# Patient Record
Sex: MALE | Race: WHITE | Employment: OTHER | URBAN - METROPOLITAN AREA
[De-identification: names, ages, dates, MRNs, and addresses within clinical notes are randomized per-mention and may not be internally consistent; named-entity substitution may affect disease eponyms.]

---

## 2017-10-16 ENCOUNTER — OFFICE VISIT (OUTPATIENT)
Dept: ORTHOPEDIC SURGERY | Age: 77
End: 2017-10-16

## 2017-10-16 VITALS — BODY MASS INDEX: 28.44 KG/M2 | HEIGHT: 72 IN | WEIGHT: 210 LBS

## 2017-10-16 DIAGNOSIS — R52 PAIN: ICD-10-CM

## 2017-10-16 DIAGNOSIS — M17.12 PATELLOFEMORAL ARTHRITIS OF LEFT KNEE: Primary | ICD-10-CM

## 2017-10-16 PROCEDURE — G8419 CALC BMI OUT NRM PARAM NOF/U: HCPCS | Performed by: ORTHOPAEDIC SURGERY

## 2017-10-16 PROCEDURE — 1123F ACP DISCUSS/DSCN MKR DOCD: CPT | Performed by: ORTHOPAEDIC SURGERY

## 2017-10-16 PROCEDURE — G8484 FLU IMMUNIZE NO ADMIN: HCPCS | Performed by: ORTHOPAEDIC SURGERY

## 2017-10-16 PROCEDURE — 99213 OFFICE O/P EST LOW 20 MIN: CPT | Performed by: ORTHOPAEDIC SURGERY

## 2017-10-16 PROCEDURE — 73564 X-RAY EXAM KNEE 4 OR MORE: CPT | Performed by: ORTHOPAEDIC SURGERY

## 2017-10-16 PROCEDURE — G8428 CUR MEDS NOT DOCUMENT: HCPCS | Performed by: ORTHOPAEDIC SURGERY

## 2017-10-16 PROCEDURE — 1036F TOBACCO NON-USER: CPT | Performed by: ORTHOPAEDIC SURGERY

## 2017-10-16 PROCEDURE — 4040F PNEUMOC VAC/ADMIN/RCVD: CPT | Performed by: ORTHOPAEDIC SURGERY

## 2017-10-16 NOTE — PROGRESS NOTES
There is no weakness or sensory deficit. Left Knee Examination:    Inspection:  No swelling, ecchymosis, deformity    Palpation:  There is palpation over the lateral facet. No significant medial or lateral joint line pain. Range of Motion:  ° of knee flexion. He does have about a 10-15° extension lag. Mild retropatellar crepitation. Tight hamstrings noted. Strength:  4/5 quad strength. 4+/5 hamstring strength. Special Tests:  Negative Nils's exam.  Negative Lachman's exam.  Stable to varus and valgus stress testing. Positive patellar grind. Skin: There are no rashes, ulcerations or lesions. Gait: Antalgic flexed knee gait pattern on the left    Reflex normal deep tendon reflexes    Additional Comments:       Additional Examinations:         Contralateral Exam: Examination of the right knee reveals intact skin. There is no focal tenderness. The patient demonstrates full painless range of motion with regard to flexion and extension. Strength is 5/5 throughout all planes. Ligamentous stability is grossly intact. Examination of the left hip reveals intact skin. The patient demonstrates full painless range of motion with regards to flexion, abduction, internal and external rotation. There is no tenderness about the greater trochanter. There is a negative straight leg raise against resistance. Strength is 5/5 throughout all planes. Radiology:     X-rays obtained and reviewed in office:  Views 4 views including AP weightbearing, PA flexed weightbearing, lateral and skyline  Location left knee  Impression there is relatively well-maintained joint space of the tibiofemoral joints. There is evidence of advanced arthritic changes of the patellofemoral joint with   previous surgery      Impression:  Encounter Diagnoses   Name Primary?     Pain     Patellofemoral arthritis of left knee Yes       Office Procedures:  Orders Placed This Encounter   Procedures    XR KNEE LEFT (MIN 4 VIEWS) [id number]     Order Specific Question:   Reason for exam:     Answer:   Pain       Treatment Plan: Today we've gone over the diagnosis and the recommendations. I think he was just here interested in figuring out what was the cause of his symptoms. He is relatively asymptomatic and states the pain is not that bad. He understands and eventually could have more pain with his patellofemoral joint. Would recommend over-the-counter oral anti-inflammatories or Tylenol therapeutic exercises for strengthening.   Follow-up in the office as needed

## 2017-12-01 PROBLEM — D75.89 MACROCYTOSIS: Status: ACTIVE | Noted: 2017-12-01

## 2017-12-01 PROBLEM — G93.40 ENCEPHALOPATHY ACUTE: Status: ACTIVE | Noted: 2017-12-01

## 2017-12-01 PROBLEM — R56.9 SEIZURE (HCC): Status: ACTIVE | Noted: 2017-12-01

## 2017-12-01 PROBLEM — J96.90 RESPIRATORY FAILURE (HCC): Status: ACTIVE | Noted: 2017-12-01

## 2017-12-01 PROBLEM — I10 HTN (HYPERTENSION): Status: ACTIVE | Noted: 2017-12-01

## 2017-12-01 PROBLEM — E78.5 HLD (HYPERLIPIDEMIA): Status: ACTIVE | Noted: 2017-12-01

## 2017-12-01 PROBLEM — J18.9 PNA (PNEUMONIA): Status: ACTIVE | Noted: 2017-12-01

## 2017-12-15 ENCOUNTER — TELEPHONE (OUTPATIENT)
Dept: NEUROLOGY | Age: 77
End: 2017-12-15

## 2017-12-15 NOTE — TELEPHONE ENCOUNTER
Patient calling, okay to schedule follow up? Please advise. UC notes are available in Care Everywhere.     Patient completed Inpatient Rehab at [facility] on 12.11.17

## 2018-01-11 ENCOUNTER — OFFICE VISIT (OUTPATIENT)
Dept: NEUROLOGY | Age: 78
End: 2018-01-11

## 2018-01-11 VITALS
HEART RATE: 81 BPM | OXYGEN SATURATION: 91 % | WEIGHT: 207 LBS | HEIGHT: 72 IN | DIASTOLIC BLOOD PRESSURE: 84 MMHG | BODY MASS INDEX: 28.04 KG/M2 | SYSTOLIC BLOOD PRESSURE: 116 MMHG

## 2018-01-11 DIAGNOSIS — F10.10 ETOH ABUSE: ICD-10-CM

## 2018-01-11 DIAGNOSIS — F02.80 DEMENTIA DUE TO ALZHEIMER'S DISEASE (HCC): ICD-10-CM

## 2018-01-11 DIAGNOSIS — I10 ESSENTIAL HYPERTENSION: ICD-10-CM

## 2018-01-11 DIAGNOSIS — G30.9 DEMENTIA DUE TO ALZHEIMER'S DISEASE (HCC): ICD-10-CM

## 2018-01-11 DIAGNOSIS — G40.909 SEIZURE DISORDER (HCC): ICD-10-CM

## 2018-01-11 DIAGNOSIS — R56.9 NEW ONSET SEIZURE (HCC): Primary | ICD-10-CM

## 2018-01-11 PROCEDURE — G8484 FLU IMMUNIZE NO ADMIN: HCPCS | Performed by: PSYCHIATRY & NEUROLOGY

## 2018-01-11 PROCEDURE — G8419 CALC BMI OUT NRM PARAM NOF/U: HCPCS | Performed by: PSYCHIATRY & NEUROLOGY

## 2018-01-11 PROCEDURE — 4040F PNEUMOC VAC/ADMIN/RCVD: CPT | Performed by: PSYCHIATRY & NEUROLOGY

## 2018-01-11 PROCEDURE — 1036F TOBACCO NON-USER: CPT | Performed by: PSYCHIATRY & NEUROLOGY

## 2018-01-11 PROCEDURE — 99214 OFFICE O/P EST MOD 30 MIN: CPT | Performed by: PSYCHIATRY & NEUROLOGY

## 2018-01-11 PROCEDURE — 1123F ACP DISCUSS/DSCN MKR DOCD: CPT | Performed by: PSYCHIATRY & NEUROLOGY

## 2018-01-11 PROCEDURE — G8427 DOCREV CUR MEDS BY ELIG CLIN: HCPCS | Performed by: PSYCHIATRY & NEUROLOGY

## 2018-01-11 RX ORDER — LEVETIRACETAM 1000 MG/1
1000 TABLET ORAL 2 TIMES DAILY
Qty: 180 TABLET | Refills: 1 | Status: SHIPPED | OUTPATIENT
Start: 2018-01-11 | End: 2018-07-18

## 2018-01-11 ASSESSMENT — ENCOUNTER SYMPTOMS
RESPIRATORY NEGATIVE: 1
GASTROINTESTINAL NEGATIVE: 1
EYES NEGATIVE: 1

## 2018-01-11 NOTE — PROGRESS NOTES
mg by mouth daily Yes Historical Provider, MD   galantamine (RAZADYNE ER) 24 MG extended release capsule Take 24 mg by mouth daily (with breakfast) Yes Historical Provider, MD     No Known Allergies  Social History   Substance Use Topics    Smoking status: Former Smoker     Quit date: 11/13/2003    Smokeless tobacco: Former User      Comment: cigar smoker    Alcohol use 1.2 - 1.8 oz/week     2 - 3 Cans of beer per week      Comment: 3 week      Family History   Problem Relation Age of Onset    Heart Disease Mother     Heart Disease Father      Past Surgical History:   Procedure Laterality Date    ANKLE SURGERY      RT    APPENDECTOMY      CHOLECYSTECTOMY      EYE SURGERY      bilateral cataracts    HERNIA REPAIR      rihr  x 2    KNEE ARTHROSCOPY Right 11/19/2013    KNEE SURGERY      QUADRACEPS TENDON REPAIR Left 7/17/15    SHOULDER ARTHROSCOPY  9/20/11    video arthroscopy shoulder debridement acromioplasty         Exam:   Constitutional: +  Vitals:    01/11/18 1316   BP: 116/84   Pulse: 81   SpO2: 91%   Weight: 207 lb (93.9 kg)   Height: 6' (1.829 m)       General appearance: well-nourished. Eye: No icterus. Neck: supple  Cardiovascular:    Heart: S1, S2         No lower leg edema with good pulsation. Mental Status: Oriented to person, place, problem, and time. Fluent speech. Normal attention span and concentration. Cranial Nerves:   II: Visual fields: Full to confrontation  III: Pupils: equal, round, reactive to light  III,IV,VI: Extra Ocular Movements are intact. No nystagmus  V: Facial sensation is intact to pin prick and light touch  VII: Facial strength and movements: intact and symmetric smile,cheek puffing and eyebrow elevation  VIII: Hearing: Intact to finger rub bilaterally  IX: Palate elevation is symmetric  XI: Shoulder shrug is intact  XII: Tongue movements are normal  Musculoskeletal: 5/5 in all 4 extremities. Normal tone.    Reflexes: Bilateral biceps 2/4, triceps 2/4,

## 2018-01-11 NOTE — LETTER
Tram Burrell MD    [facility]  [address] [facility], [address] [place] [address]. [address] [place] [address]    [phone] (Phone)  [phone] (Fax)    Dear Dr Asia Maxwell MD    I had the pleasure of seeing your patient Tito Slaughter  1940 today. I have attached a detailed summary below:    The patient came today for follow up regarding: New onset seizure and hospital follow up    The patient was seen last month at [facility]. He came in with recurrent seizures and status epilepticus. He was transferred to [facility] for continuous EEG recording. Further workup revealed no specific etiology for his new onset seizure. He was discharged home on Keppra 1000 mg twice daily. He came today for his follow-up. He denies any more seizures. He denies any side effect of Keppra. CT of the head showed no acute lesions and continuous EEG showed diffuse slowing. He has a pacemaker and cannot have an MRI of the brain. He denies any recent falling or injury or passing out. He used to drink socially but he stopped drinking since his discharge from the hospital.    He has history of chronic cognitive impairment and moderate dementia. He is on Razadyne daily. History of hypertension on medications. He is on Lipitor. Denies any recent head trauma or fever or chills. No worsening of his memory after his seizure. He does not drive. Other review of system was unremarkable. Past Medical History:   Diagnosis Date    Ankle fracture, right     Arthritis     Diastolic murmur     Gout     Heart valve problem     -long term ,no problem with    Hyperlipidemia     Hypertension     Pacemaker      Prior to Visit Medications    Medication Sig Taking?  Authorizing Provider   atorvastatin (LIPITOR) 80 MG tablet Take 1 tablet by mouth nightly Yes Naty Godwin MD   levETIRAcetam (KEPPRA) 1000 MG tablet Take 1 tablet by mouth 2 times daily Yes Rachel Mills MD   allopurinol (ZYLOPRIM) 300 MG tablet Take 300 mg by mouth daily Yes Historical Provider, MD   indomethacin (INDOCIN) 50 MG capsule Take 50 mg by mouth 2 times daily (with meals) Yes Historical Provider, MD   lisinopril (PRINIVIL;ZESTRIL) 10 MG tablet Take 10 mg by mouth daily Yes Historical Provider, MD   amLODIPine (NORVASC) 5 MG tablet Take 5 mg by mouth daily Yes Historical Provider, MD   galantamine (RAZADYNE ER) 24 MG extended release capsule Take 24 mg by mouth daily (with breakfast) Yes Historical Provider, MD     No Known Allergies  Social History   Substance Use Topics    Smoking status: Former Smoker     Quit date: 11/13/2003    Smokeless tobacco: Former User      Comment: cigar smoker    Alcohol use 1.2 - 1.8 oz/week     2 - 3 Cans of beer per week      Comment: 3 week      Family History   Problem Relation Age of Onset    Heart Disease Mother     Heart Disease Father      Past Surgical History:   Procedure Laterality Date    ANKLE SURGERY      RT    APPENDECTOMY      CHOLECYSTECTOMY      EYE SURGERY      bilateral cataracts    HERNIA REPAIR      rihr  x 2    KNEE ARTHROSCOPY Right 11/19/2013    KNEE SURGERY      QUADRACEPS TENDON REPAIR Left 7/17/15    SHOULDER ARTHROSCOPY  9/20/11    video arthroscopy shoulder debridement acromioplasty         Exam:   Constitutional: +  Vitals:    01/11/18 1316   BP: 116/84   Pulse: 81   SpO2: 91%   Weight: 207 lb (93.9 kg)   Height: 6' (1.829 m)       General appearance: well-nourished. Eye: No icterus. Neck: supple  Cardiovascular:    Heart: S1, S2         No lower leg edema with good pulsation. Mental Status: Oriented to person, place, problem, and time. Fluent speech. Normal attention span and concentration. Cranial Nerves:   II: Visual fields: Full to confrontation  III: Pupils: equal, round, reactive to light  III,IV,VI: Extra Ocular Movements are intact.  No nystagmus V: Facial sensation is intact to pin prick and light touch  VII: Facial strength and movements: intact and symmetric smile,cheek puffing and eyebrow elevation  VIII: Hearing: Intact to finger rub bilaterally  IX: Palate elevation is symmetric  XI: Shoulder shrug is intact  XII: Tongue movements are normal  Musculoskeletal: 5/5 in all 4 extremities. Normal tone. Reflexes: Bilateral biceps 2/4, triceps 2/4, brachial radialis 2/4, knee 2/4 and ankle 2/4. Planters: flexor bilaterally. Coordination: no pronator drift, no dysmetria. Finger nose finger testing within normal limits. Sensation: normal to all modalities. Gait/Posture: steady    Review of Systems   Constitutional: Negative. HENT: Negative. Eyes: Negative. Respiratory: Negative. Cardiovascular: Negative. Gastrointestinal: Negative. Genitourinary: Negative. Musculoskeletal: Negative. Skin: Negative. Neurological: Negative. Endo/Heme/Allergies: Negative. Psychiatric/Behavioral: Negative. I personally reviewed social history, past medical history, medications, allergy, surgical history, and family history as documented in the patient's electronic health records. Labs and test results: reviewed and discussed with the patient. Reviewed outside records including recent  hospital stay and records from his continuous EEG recording, test results, LP results and notes from different physicians. Assessment:  New onset seizure disorder and status epilepticus, improved. So far idiopathic or could be new onset epilepsy. Hypertension  Chronic cognitive impairment and dementia could be of Alzheimer type  Hyperlipidemia  Alcohol abuse      Plan:  I had a long discussion with the patient and his wife regarding seizure precautions, risk of falling and injury, driving restrictions and side effect from [address]. We also addressed the issue of drinking and advised the patient to stop drinking due to risk of seizure. The patient will continue with Keppra 1000 mg twice daily  I gave the patient six months refill of Keppra  No driving for at least three months provided seizure-free  Continue Razadyne for his dementia  Continue the same blood pressure medications and add aspirin daily  Improving sleep hygiene    RTC 6 months    Please do not hesitate to contact me, should you have any questions or concerns regarding the care of Teri Milligan     Sincerely,    Gabriela Stein MD    This dictation was generated by voice recognition computer software. Although all attempts are made to edit the dictation for accuracy, there may be errors in the transcription that are not intended.

## 2018-07-05 PROBLEM — R56.9 SEIZURES (HCC): Status: ACTIVE | Noted: 2018-07-05

## 2018-07-18 ENCOUNTER — OFFICE VISIT (OUTPATIENT)
Dept: NEUROLOGY | Age: 78
End: 2018-07-18

## 2018-07-18 VITALS
OXYGEN SATURATION: 95 % | BODY MASS INDEX: 27.9 KG/M2 | HEART RATE: 69 BPM | DIASTOLIC BLOOD PRESSURE: 76 MMHG | WEIGHT: 206 LBS | SYSTOLIC BLOOD PRESSURE: 122 MMHG | HEIGHT: 72 IN

## 2018-07-18 DIAGNOSIS — E78.5 DYSLIPIDEMIA: ICD-10-CM

## 2018-07-18 DIAGNOSIS — I10 HTN (HYPERTENSION), BENIGN: ICD-10-CM

## 2018-07-18 DIAGNOSIS — F02.80 DEMENTIA DUE TO ALZHEIMER'S DISEASE (HCC): ICD-10-CM

## 2018-07-18 DIAGNOSIS — F10.10 ETOH ABUSE: ICD-10-CM

## 2018-07-18 DIAGNOSIS — G40.109 EPILEPSY, FOCAL (HCC): Primary | ICD-10-CM

## 2018-07-18 DIAGNOSIS — G30.9 DEMENTIA DUE TO ALZHEIMER'S DISEASE (HCC): ICD-10-CM

## 2018-07-18 PROCEDURE — G8419 CALC BMI OUT NRM PARAM NOF/U: HCPCS | Performed by: PSYCHIATRY & NEUROLOGY

## 2018-07-18 PROCEDURE — 99214 OFFICE O/P EST MOD 30 MIN: CPT | Performed by: PSYCHIATRY & NEUROLOGY

## 2018-07-18 PROCEDURE — 4040F PNEUMOC VAC/ADMIN/RCVD: CPT | Performed by: PSYCHIATRY & NEUROLOGY

## 2018-07-18 PROCEDURE — 1036F TOBACCO NON-USER: CPT | Performed by: PSYCHIATRY & NEUROLOGY

## 2018-07-18 PROCEDURE — 1123F ACP DISCUSS/DSCN MKR DOCD: CPT | Performed by: PSYCHIATRY & NEUROLOGY

## 2018-07-18 PROCEDURE — G8427 DOCREV CUR MEDS BY ELIG CLIN: HCPCS | Performed by: PSYCHIATRY & NEUROLOGY

## 2018-07-18 PROCEDURE — 1101F PT FALLS ASSESS-DOCD LE1/YR: CPT | Performed by: PSYCHIATRY & NEUROLOGY

## 2018-07-18 RX ORDER — LEVETIRACETAM 750 MG/1
1500 TABLET ORAL 2 TIMES DAILY
COMMUNITY
Start: 2018-07-09 | End: 2018-09-14

## 2018-07-18 NOTE — PROGRESS NOTES
The patient came today for follow up regarding: Focal epilepsy and recent recurrent seizures. Since the patient's last visit, he was seen at [facility] ED about 2 weeks ago when he was observed to have recurrent focal seizures. Description was confusion with head version toward the left side for a few seconds to minute. He had clusters of seizure at home. He was seen at [facility] ED and then transferred to [facility] where he was admitted for a few days. Degree was severe. No triggers or other associated symptoms. He was consistent with Keppra at home. No recent head trauma or fever or chills. Last MRI of the brain from December was unremarkable. I was able to review such records and below is a summary of his evaluation. The patient was discharged home on higher dose of Keppra 1500 mg twice daily. He came today for his follow-up. He denies any side effect from [address]. He does not drive. He denies any suicidal ideation or thoughts are severe depression. He has been consistent with Keppra. He drinks socially. He is on aspirin and statin for stroke prevention. He is on few blood pressure medications. Blood pressure has been waxing and waning. He has a same chronic cognitive impairment and dementia and he takes Razadyne daily. He denies a severe sleep disturbance, insomnia or symptoms of sleep apnea. No recent fever or chills.   Other review of system was unremarkable.    ----------------------------------------------------------------------------------------    Copies of UC from his last admission:    cEEG start date & time: 07/07/2018 0500     The recording is remarkable for the presence of:   · Epileptiform discharges over the rt frontotemporal head regions maximal over the F8 EEG lead  · Multiple push buttons events with patient's head turning to left for a few seconds were seen without an EEG correlate.     During the recording:   Video was reviewed intermittently at times when significant amounts of EMG artifact were present.     The EKG monitoring channel did not detect any significant rhythm abnormalities.     EEG Interpretation: This EEG is abnormal secondary to:     · Epileptiform activity over the rt frontotemporal head region. This finding is consistent with an area of focal cortical irritability and a process of epileptogenic potential.      · Multiple episodes of brief, forced head turn to the left, that were stereotyped, but not associated with any obvoious EEG ictal discharge. Based on the stereotyped nature and the brief duration, these episodes are favored to be focal seizures without scalp manifestation. psychogenic nonepileptic seizures are thought to be much less likely. No electrographic seizures or non-convulsive status epilepticus was seen over the entire monitoring period. Clinical correlation is recommended. Past Medical History:   Diagnosis Date    Ankle fracture, right     Arthritis     Diastolic murmur     Gout     Heart valve problem     2011-long term ,no problem with    Hyperlipidemia     Hypertension     Pacemaker      Prior to Visit Medications    Medication Sig Taking?  Authorizing Provider   levETIRAcetam (KEPPRA) 750 MG tablet Take 1,500 mg by mouth 2 times daily Yes Historical Provider, MD   aspirin 81 MG tablet Take 81 mg by mouth daily Yes Historical Provider, MD   atorvastatin (LIPITOR) 80 MG tablet Take 1 tablet by mouth nightly Yes Cristhian Campbell MD   allopurinol (ZYLOPRIM) 300 MG tablet Take 300 mg by mouth daily Yes Historical Provider, MD   indomethacin (INDOCIN) 50 MG capsule Take 50 mg by mouth 2 times daily (with meals) Yes Historical Provider, MD   lisinopril (PRINIVIL;ZESTRIL) 10 MG tablet Take 10 mg by mouth daily Yes Historical Provider, MD   amLODIPine (NORVASC) 5 MG tablet Take 5 mg by mouth daily Yes Historical Provider, MD   galantamine (RAZADYNE ER) 24 MG extended release capsule Take 24 mg by mouth daily (with breakfast) Yes Historical Provider, MD     No Known Allergies  Social History   Substance Use Topics    Smoking status: Former Smoker     Quit date: 11/13/2003    Smokeless tobacco: Former User      Comment: cigar smoker    Alcohol use 1.2 - 1.8 oz/week     2 - 3 Cans of beer per week      Comment: 3 week      Family History   Problem Relation Age of Onset    Heart Disease Mother     Heart Disease Father      Past Surgical History:   Procedure Laterality Date    ANKLE SURGERY      RT    APPENDECTOMY      CHOLECYSTECTOMY      EYE SURGERY      bilateral cataracts    HERNIA REPAIR      rihr  x 2    KNEE ARTHROSCOPY Right 11/19/2013    KNEE SURGERY      QUADRACEPS TENDON REPAIR Left 7/17/15    SHOULDER ARTHROSCOPY  9/20/11    video arthroscopy shoulder debridement acromioplasty         Exam:   Constitutional:   Vitals:    07/18/18 1054   BP: 122/76   Pulse: 69   SpO2: 95%   Weight: 206 lb (93.4 kg)   Height: 6' (1.829 m)       General appearance: well-nourished. Eye: No icterus. No blurring of optic disc. Neck: supple  Cardiovascular: No carotid bruit. Heart: S1, S2         No lower leg edema with good pulsation. Mental Status: Oriented to person, place, problem, and time. Fluent speech. Poor fund of knowledge. Normal attention span and concentration. Cranial Nerves:   II: Visual fields: Full to confrontation  III: Pupils: equal, round, reactive to light  III,IV,VI: Extra Ocular Movements are intact. No nystagmus  V: Facial sensation is intact to pin prick and light touch  VII: Facial strength and movements: intact and symmetric smile,cheek puffing and eyebrow elevation  VIII: Hearing: Intact to finger rub bilaterally  IX: Palate elevation is symmetric  XI: Shoulder shrug is intact  XII: Tongue movements are normal  Musculoskeletal: 5/5 in all 4 extremities. Normal tone. Reflexes: Bilateral biceps 2/4, triceps 2/4, brachial radialis 2/4, knee 2/4 and ankle 2/4.    Planters: flexor

## 2018-09-13 DIAGNOSIS — G40.909 SEIZURE DISORDER (HCC): ICD-10-CM

## 2018-09-13 RX ORDER — LEVETIRACETAM 1000 MG/1
1500 TABLET ORAL 2 TIMES DAILY
Qty: 180 TABLET | Refills: 1 | Status: SHIPPED | OUTPATIENT
Start: 2018-09-13 | End: 2018-09-13 | Stop reason: SDUPTHER

## 2018-09-14 RX ORDER — LEVETIRACETAM 1000 MG/1
TABLET ORAL
Qty: 270 TABLET | Refills: 0 | Status: SHIPPED | OUTPATIENT
Start: 2018-09-14 | End: 2019-05-21

## 2018-09-14 NOTE — TELEPHONE ENCOUNTER
Ts signed for 2 month supply with 1 refill on 09.13.18    Will send rx in for 3 month supply with no refill.

## 2018-10-24 ENCOUNTER — TELEPHONE (OUTPATIENT)
Dept: NEUROLOGY | Age: 78
End: 2018-10-24

## 2018-10-24 ENCOUNTER — OFFICE VISIT (OUTPATIENT)
Dept: NEUROLOGY | Age: 78
End: 2018-10-24
Payer: MEDICARE

## 2018-10-24 VITALS
DIASTOLIC BLOOD PRESSURE: 75 MMHG | OXYGEN SATURATION: 95 % | HEART RATE: 60 BPM | HEIGHT: 72 IN | SYSTOLIC BLOOD PRESSURE: 112 MMHG | WEIGHT: 206 LBS | BODY MASS INDEX: 27.9 KG/M2

## 2018-10-24 DIAGNOSIS — G40.109 EPILEPSY, FOCAL (HCC): Primary | ICD-10-CM

## 2018-10-24 DIAGNOSIS — E78.5 DYSLIPIDEMIA: ICD-10-CM

## 2018-10-24 DIAGNOSIS — I10 HTN (HYPERTENSION), BENIGN: ICD-10-CM

## 2018-10-24 DIAGNOSIS — F10.10 ETOH ABUSE: ICD-10-CM

## 2018-10-24 DIAGNOSIS — G30.9 DEMENTIA DUE TO ALZHEIMER'S DISEASE (HCC): ICD-10-CM

## 2018-10-24 DIAGNOSIS — F02.80 DEMENTIA DUE TO ALZHEIMER'S DISEASE (HCC): ICD-10-CM

## 2018-10-24 PROCEDURE — G8427 DOCREV CUR MEDS BY ELIG CLIN: HCPCS | Performed by: PSYCHIATRY & NEUROLOGY

## 2018-10-24 PROCEDURE — 1036F TOBACCO NON-USER: CPT | Performed by: PSYCHIATRY & NEUROLOGY

## 2018-10-24 PROCEDURE — 99214 OFFICE O/P EST MOD 30 MIN: CPT | Performed by: PSYCHIATRY & NEUROLOGY

## 2018-10-24 PROCEDURE — G8419 CALC BMI OUT NRM PARAM NOF/U: HCPCS | Performed by: PSYCHIATRY & NEUROLOGY

## 2018-10-24 PROCEDURE — 1101F PT FALLS ASSESS-DOCD LE1/YR: CPT | Performed by: PSYCHIATRY & NEUROLOGY

## 2018-10-24 PROCEDURE — 1123F ACP DISCUSS/DSCN MKR DOCD: CPT | Performed by: PSYCHIATRY & NEUROLOGY

## 2018-10-24 PROCEDURE — G8484 FLU IMMUNIZE NO ADMIN: HCPCS | Performed by: PSYCHIATRY & NEUROLOGY

## 2018-10-24 PROCEDURE — 4040F PNEUMOC VAC/ADMIN/RCVD: CPT | Performed by: PSYCHIATRY & NEUROLOGY

## 2018-10-24 NOTE — PROGRESS NOTES
status: Former Smoker     Quit date: 11/13/2003    Smokeless tobacco: Former User      Comment: cigar smoker    Alcohol use 1.2 - 1.8 oz/week     2 - 3 Cans of beer per week      Comment: 3 week      Family History   Problem Relation Age of Onset    Heart Disease Mother     Heart Disease Father      Past Surgical History:   Procedure Laterality Date    ANKLE SURGERY      RT    APPENDECTOMY      CHOLECYSTECTOMY      EYE SURGERY      bilateral cataracts    HERNIA REPAIR      rihr  x 2    KNEE ARTHROSCOPY Right 11/19/2013    KNEE SURGERY      QUADRACEPS TENDON REPAIR Left 7/17/15    SHOULDER ARTHROSCOPY  9/20/11    video arthroscopy shoulder debridement acromioplasty         Exam:   Constitutional:   Vitals:    10/24/18 1207   BP: 112/75   Pulse: 60   SpO2: 95%   Weight: 206 lb (93.4 kg)   Height: 6' (1.829 m)       General appearance: well-nourished. Eye: No icterus. Neck: supple  Cardiovascular: No carotid bruit. No lower leg edema with good pulsation. Mental Status: Oriented to person, place, problem, and time. Fluent speech. Poor fund of knowledge. Normal attention span and concentration. Cranial Nerves:   II: Visual fields: Full to confrontation  III: Pupils: equal, round, reactive to light  III,IV,VI: Extra Ocular Movements are intact. No nystagmus  V: Facial sensation is intact to pin prick and light touch  VII: Facial strength and movements: intact and symmetric smile,cheek puffing and eyebrow elevation  VIII: Hearing: Intact to finger rub bilaterally  IX: Palate elevation is symmetric  XI: Shoulder shrug is intact  XII: Tongue movements are normal  Musculoskeletal: 5/5 in all 4 extremities. Normal tone. Reflexes: Bilateral biceps 2/4, triceps 2/4, brachial radialis 2/4, knee 2/4 and ankle 2/4. Planters: flexor bilaterally. Coordination: no pronator drift, no dysmetria. Finger nose finger testing within normal limits.   Sensation: normal to all

## 2018-10-26 ENCOUNTER — HOSPITAL ENCOUNTER (OUTPATIENT)
Age: 78
Discharge: HOME | End: 2018-10-26
Payer: MEDICARE

## 2018-10-26 ENCOUNTER — HOSPITAL ENCOUNTER (OUTPATIENT)
Dept: MRI IMAGING | Age: 78
Discharge: HOME | End: 2018-10-26
Payer: MEDICARE

## 2018-10-26 DIAGNOSIS — G40.109 EPILEPSY, FOCAL (HCC): ICD-10-CM

## 2018-10-26 LAB
BUN BLDV-MCNC: 15 MG/DL (ref 7–20)
CREAT SERPL-MCNC: 0.9 MG/DL (ref 0.8–1.3)
GFR AFRICAN AMERICAN: >60
GFR NON-AFRICAN AMERICAN: >60

## 2018-10-26 PROCEDURE — 84520 ASSAY OF UREA NITROGEN: CPT

## 2018-10-26 PROCEDURE — A9579 GAD-BASE MR CONTRAST NOS,1ML: HCPCS | Performed by: PSYCHIATRY & NEUROLOGY

## 2018-10-26 PROCEDURE — 70553 MRI BRAIN STEM W/O & W/DYE: CPT

## 2018-10-26 PROCEDURE — 82565 ASSAY OF CREATININE: CPT

## 2018-10-26 PROCEDURE — 6360000004 HC RX CONTRAST MEDICATION: Performed by: PSYCHIATRY & NEUROLOGY

## 2018-10-26 PROCEDURE — 36415 COLL VENOUS BLD VENIPUNCTURE: CPT

## 2018-10-26 RX ADMIN — GADOTERIDOL 18 ML: 279.3 INJECTION, SOLUTION INTRAVENOUS at 15:46

## 2018-10-26 NOTE — FLOWSHEET NOTE
Patient arrived for scan with MRI compatible pacemaker. Cardiology approval and patient screening completed per protocol. Representative from pacemaker company set pacemaker to safe mode. Patient's ECG and SPO2 were monitored throughout the scan with no difficulty. Representative from pacemaker company reset pacemaker to previous settings. .  Scan was completed with no complications.

## 2019-01-09 DIAGNOSIS — G40.909 SEIZURE DISORDER (HCC): ICD-10-CM

## 2019-01-09 RX ORDER — LEVETIRACETAM 1000 MG/1
1500 TABLET ORAL 2 TIMES DAILY
Qty: 270 TABLET | Refills: 1 | Status: SHIPPED | OUTPATIENT
Start: 2019-01-09 | End: 2019-05-21 | Stop reason: SDUPTHER

## 2019-05-21 ENCOUNTER — OFFICE VISIT (OUTPATIENT)
Dept: NEUROLOGY | Age: 79
End: 2019-05-21
Payer: MEDICARE

## 2019-05-21 VITALS
BODY MASS INDEX: 28.17 KG/M2 | WEIGHT: 208 LBS | OXYGEN SATURATION: 94 % | DIASTOLIC BLOOD PRESSURE: 98 MMHG | HEART RATE: 75 BPM | HEIGHT: 72 IN | SYSTOLIC BLOOD PRESSURE: 139 MMHG

## 2019-05-21 DIAGNOSIS — G30.9 DEMENTIA DUE TO ALZHEIMER'S DISEASE (HCC): ICD-10-CM

## 2019-05-21 DIAGNOSIS — G40.909 SEIZURE DISORDER (HCC): Primary | ICD-10-CM

## 2019-05-21 DIAGNOSIS — F10.10 ETOH ABUSE: ICD-10-CM

## 2019-05-21 DIAGNOSIS — E78.5 DYSLIPIDEMIA: ICD-10-CM

## 2019-05-21 DIAGNOSIS — F02.80 DEMENTIA DUE TO ALZHEIMER'S DISEASE (HCC): ICD-10-CM

## 2019-05-21 DIAGNOSIS — G40.109 EPILEPSY, FOCAL (HCC): ICD-10-CM

## 2019-05-21 DIAGNOSIS — I10 HTN (HYPERTENSION), BENIGN: ICD-10-CM

## 2019-05-21 PROCEDURE — 4040F PNEUMOC VAC/ADMIN/RCVD: CPT | Performed by: PSYCHIATRY & NEUROLOGY

## 2019-05-21 PROCEDURE — 99214 OFFICE O/P EST MOD 30 MIN: CPT | Performed by: PSYCHIATRY & NEUROLOGY

## 2019-05-21 PROCEDURE — G8419 CALC BMI OUT NRM PARAM NOF/U: HCPCS | Performed by: PSYCHIATRY & NEUROLOGY

## 2019-05-21 PROCEDURE — G8427 DOCREV CUR MEDS BY ELIG CLIN: HCPCS | Performed by: PSYCHIATRY & NEUROLOGY

## 2019-05-21 PROCEDURE — 1036F TOBACCO NON-USER: CPT | Performed by: PSYCHIATRY & NEUROLOGY

## 2019-05-21 PROCEDURE — 1123F ACP DISCUSS/DSCN MKR DOCD: CPT | Performed by: PSYCHIATRY & NEUROLOGY

## 2019-05-21 RX ORDER — LEVETIRACETAM 1000 MG/1
1500 TABLET ORAL 2 TIMES DAILY
Qty: 270 TABLET | Refills: 1 | Status: SHIPPED | OUTPATIENT
Start: 2019-05-21 | End: 2019-11-12 | Stop reason: SDUPTHER

## 2019-05-21 NOTE — PROGRESS NOTES
The patient came today for follow up regarding: Epilepsy, history of stroke and memory loss. Since the patient's last visit, he denies any seizure. Last seizure could be 2 years ago. Description was brief motor seizure for few seconds to minutes. Degree was severe. No triggers or other associated symptoms. He is on Keppra 1500 mg twice daily. No side effect from Keppra. No suicidal ideation or thoughts. He is trying to quit drinking. He now drinks socially. Denies any recent falling or injury or blackout. No head trauma or fever or chills. History of hypertension controlled on medication. He is on aspirin. He takes statin at night. He quit smoking. History of chronic cognitive impairment which is waxing and waning and daily. Degree is mild to moderate. No psychosis or hallucination. He denies any severe insomnia or other sleep disturbance. He is on Razadyne daily. He  denies any chest pain, dysphagia or dysarthria, neck or back pain or weakness or numbness or tingling. Other review of system was unremarkable. Past Medical History:   Diagnosis Date    Ankle fracture, right     Arthritis     Diastolic murmur     Gout     Heart valve problem     2011-long term ,no problem with    Hyperlipidemia     Hypertension     Pacemaker      Prior to Visit Medications    Medication Sig Taking?  Authorizing Provider   levETIRAcetam (KEPPRA) 1000 MG tablet Take 1.5 tablets by mouth 2 times daily Yes Kobe Faith MD   aspirin 81 MG tablet Take 81 mg by mouth daily Yes Historical Provider, MD   atorvastatin (LIPITOR) 80 MG tablet Take 1 tablet by mouth nightly Yes Alba Pickens MD   allopurinol (ZYLOPRIM) 300 MG tablet Take 300 mg by mouth daily Yes Historical Provider, MD   indomethacin (INDOCIN) 50 MG capsule Take 50 mg by mouth 2 times daily (with meals) Yes Historical Provider, MD   lisinopril (PRINIVIL;ZESTRIL) 10 MG tablet Take 10 mg by mouth daily Yes Historical Provider, MD amLODIPine (NORVASC) 5 MG tablet Take 5 mg by mouth daily Yes Historical Provider, MD   galantamine (RAZADYNE ER) 24 MG extended release capsule Take 24 mg by mouth daily (with breakfast) Yes Historical Provider, MD     No Known Allergies  Social History     Tobacco Use    Smoking status: Former Smoker     Last attempt to quit: 11/13/2003     Years since quitting: 15.5    Smokeless tobacco: Former User    Tobacco comment: cigar smoker   Substance Use Topics    Alcohol use: Yes     Alcohol/week: 1.2 - 1.8 oz     Types: 2 - 3 Cans of beer per week     Comment: 3 week      Family History   Problem Relation Age of Onset    Heart Disease Mother     Heart Disease Father      Past Surgical History:   Procedure Laterality Date    ANKLE SURGERY      RT    APPENDECTOMY      CHOLECYSTECTOMY      EYE SURGERY      bilateral cataracts    HERNIA REPAIR      rihr  x 2    KNEE ARTHROSCOPY Right 11/19/2013    KNEE SURGERY      QUADRACEPS TENDON REPAIR Left 7/17/15    SHOULDER ARTHROSCOPY  9/20/11    video arthroscopy shoulder debridement acromioplasty         Exam:   Constitutional:   Vitals:    05/21/19 1344 05/21/19 1358   BP: (!) 132/96 (!) 139/98   Pulse: 75    SpO2: 94%    Weight: 208 lb (94.3 kg)    Height: 6' (1.829 m)        General appearance: well-nourished. Eye: No icterus. Neck: supple  Cardiovascular: No carotid bruit. No lower leg edema with good pulsation. Mental Status: Oriented to person, place, problem, and time. Fluent speech. Poor fund of knowledge. Normal attention span and concentration. Intact recent and remote memory. Cranial Nerves:   II: Pupils: equal, round, reactive to light  III,IV,VI: Extra Ocular Movements are intact.  No nystagmus  V: Facial sensation is intact to pin prick and light touch  VII: Facial strength and movements: intact and symmetric smile,cheek puffing and eyebrow elevation  VIII: Hearing: Intact to finger rub bilaterally  IX: Palate elevation is symmetric  XI: Shoulder shrug is intact  XII: Tongue movements are normal  Musculoskeletal: 5/5 in all 4 extremities. Normal tone. Reflexes: Bilateral biceps 2/4, triceps 2/4, brachial radialis 2/4, knee 2/4 and ankle 2/4. Planters: flexor bilaterally. Coordination: no pronator drift, no dysmetria. Finger nose finger testing within normal limits. Sensation: normal to all modalities. Gait/Posture: steady    ROS : A 10-12 system review of constitutional, cardiovascular, respiratory, musculoskeletal, endocrine, hematological, skin, SHEENT, genitourinary, psychiatric and neurologic systems was obtained and updated today which is unremarkable except as mentioned in my HPI      Medical decision making:  I personally reviewed and updated social history, past medical history, medications, allergy, surgical history, and family history as documented in the patient's electronic health records. Labs and/or neuroimaging and other test results reviewed and discussed with the patient. Reviewed notes from other physicians. Provided patient education regarding risk, benefits and treatment options as well as adherence to medication regimen and side effect from these medications. Diagnosis Orders   1. Seizure disorder (HCC)  levETIRAcetam (KEPPRA) 1000 MG tablet   2. Epilepsy, focal ([address]     3. HTN (hypertension), benign     4. ETOH abuse     5. Dyslipidemia     6.  Dementia due to Alzheimer's disease           Plan:    Continue Keppra 1500 mg twice daily  Refill for medication  Complete side effect profile was discussed with the patient in details  Seizure precautions, risk of falling or injury and driving precautions were addressed  Secondary stroke prevention with aspirin and statin  Continue blood pressure monitor at home  Continue current blood pressure medications  Avoid drinking  Fall precautions  Continue Razadyne for chronic cognitive impairment  Improving sleep hygiene, cognitive and behavior therapy to improve memory was discussed today  Follow-up in 6 month

## 2019-10-01 DIAGNOSIS — G40.909 SEIZURE DISORDER (HCC): ICD-10-CM

## 2019-10-01 RX ORDER — LEVETIRACETAM 1000 MG/1
TABLET ORAL
Qty: 270 TABLET | Refills: 0 | OUTPATIENT
Start: 2019-10-01

## 2019-11-12 ENCOUNTER — OFFICE VISIT (OUTPATIENT)
Dept: NEUROLOGY | Age: 79
End: 2019-11-12
Payer: MEDICARE

## 2019-11-12 VITALS
OXYGEN SATURATION: 96 % | SYSTOLIC BLOOD PRESSURE: 133 MMHG | WEIGHT: 208 LBS | HEART RATE: 79 BPM | BODY MASS INDEX: 28.17 KG/M2 | HEIGHT: 72 IN | DIASTOLIC BLOOD PRESSURE: 77 MMHG

## 2019-11-12 DIAGNOSIS — F02.80 DEMENTIA DUE TO ALZHEIMER'S DISEASE (HCC): ICD-10-CM

## 2019-11-12 DIAGNOSIS — G30.9 DEMENTIA DUE TO ALZHEIMER'S DISEASE (HCC): ICD-10-CM

## 2019-11-12 DIAGNOSIS — E78.5 DYSLIPIDEMIA: ICD-10-CM

## 2019-11-12 DIAGNOSIS — I10 HTN (HYPERTENSION), BENIGN: ICD-10-CM

## 2019-11-12 DIAGNOSIS — G40.909 SEIZURE DISORDER (HCC): Primary | ICD-10-CM

## 2019-11-12 DIAGNOSIS — G40.109 EPILEPSY, FOCAL (HCC): ICD-10-CM

## 2019-11-12 PROCEDURE — G8417 CALC BMI ABV UP PARAM F/U: HCPCS | Performed by: PSYCHIATRY & NEUROLOGY

## 2019-11-12 PROCEDURE — G8484 FLU IMMUNIZE NO ADMIN: HCPCS | Performed by: PSYCHIATRY & NEUROLOGY

## 2019-11-12 PROCEDURE — 4040F PNEUMOC VAC/ADMIN/RCVD: CPT | Performed by: PSYCHIATRY & NEUROLOGY

## 2019-11-12 PROCEDURE — 1123F ACP DISCUSS/DSCN MKR DOCD: CPT | Performed by: PSYCHIATRY & NEUROLOGY

## 2019-11-12 PROCEDURE — 99214 OFFICE O/P EST MOD 30 MIN: CPT | Performed by: PSYCHIATRY & NEUROLOGY

## 2019-11-12 PROCEDURE — 1036F TOBACCO NON-USER: CPT | Performed by: PSYCHIATRY & NEUROLOGY

## 2019-11-12 PROCEDURE — G8427 DOCREV CUR MEDS BY ELIG CLIN: HCPCS | Performed by: PSYCHIATRY & NEUROLOGY

## 2019-11-12 RX ORDER — LEVETIRACETAM 1000 MG/1
1500 TABLET ORAL 2 TIMES DAILY
Qty: 270 TABLET | Refills: 1 | Status: SHIPPED | OUTPATIENT
Start: 2019-11-12 | End: 2020-04-21 | Stop reason: SDUPTHER

## 2019-12-02 ENCOUNTER — OFFICE VISIT (OUTPATIENT)
Dept: ORTHOPEDIC SURGERY | Age: 79
End: 2019-12-02
Payer: MEDICARE

## 2019-12-02 VITALS — WEIGHT: 199 LBS | BODY MASS INDEX: 26.99 KG/M2

## 2019-12-02 DIAGNOSIS — S83.412A SPRAIN OF MEDIAL COLLATERAL LIGAMENT OF LEFT KNEE, INITIAL ENCOUNTER: ICD-10-CM

## 2019-12-02 DIAGNOSIS — M25.562 LEFT KNEE PAIN, UNSPECIFIED CHRONICITY: Primary | ICD-10-CM

## 2019-12-02 PROCEDURE — G8484 FLU IMMUNIZE NO ADMIN: HCPCS | Performed by: ORTHOPAEDIC SURGERY

## 2019-12-02 PROCEDURE — 1123F ACP DISCUSS/DSCN MKR DOCD: CPT | Performed by: ORTHOPAEDIC SURGERY

## 2019-12-02 PROCEDURE — 99213 OFFICE O/P EST LOW 20 MIN: CPT | Performed by: ORTHOPAEDIC SURGERY

## 2019-12-02 PROCEDURE — 1036F TOBACCO NON-USER: CPT | Performed by: ORTHOPAEDIC SURGERY

## 2019-12-02 PROCEDURE — G8417 CALC BMI ABV UP PARAM F/U: HCPCS | Performed by: ORTHOPAEDIC SURGERY

## 2019-12-02 PROCEDURE — 4040F PNEUMOC VAC/ADMIN/RCVD: CPT | Performed by: ORTHOPAEDIC SURGERY

## 2019-12-02 PROCEDURE — L1812 KO ELASTIC W/JOINTS PRE OTS: HCPCS | Performed by: ORTHOPAEDIC SURGERY

## 2019-12-02 PROCEDURE — G8427 DOCREV CUR MEDS BY ELIG CLIN: HCPCS | Performed by: ORTHOPAEDIC SURGERY

## 2020-03-25 PROBLEM — E78.5 HLD (HYPERLIPIDEMIA): Status: RESOLVED | Noted: 2017-12-01 | Resolved: 2020-03-24

## 2020-04-15 RX ORDER — LEVETIRACETAM 1000 MG/1
TABLET ORAL
Qty: 270 TABLET | Refills: 1 | OUTPATIENT
Start: 2020-04-15

## 2020-04-21 ENCOUNTER — VIRTUAL VISIT (OUTPATIENT)
Dept: NEUROLOGY | Age: 80
End: 2020-04-21
Payer: MEDICARE

## 2020-04-21 PROCEDURE — 99442 PR PHYS/QHP TELEPHONE EVALUATION 11-20 MIN: CPT | Performed by: PSYCHIATRY & NEUROLOGY

## 2020-04-21 RX ORDER — LEVETIRACETAM 1000 MG/1
1500 TABLET ORAL 2 TIMES DAILY
Qty: 270 TABLET | Refills: 0 | Status: SHIPPED | OUTPATIENT
Start: 2020-04-21 | End: 2020-06-18 | Stop reason: SDUPTHER

## 2020-06-18 ENCOUNTER — OFFICE VISIT (OUTPATIENT)
Dept: NEUROLOGY | Age: 80
End: 2020-06-18
Payer: MEDICARE

## 2020-06-18 VITALS
SYSTOLIC BLOOD PRESSURE: 115 MMHG | HEIGHT: 72 IN | HEART RATE: 78 BPM | DIASTOLIC BLOOD PRESSURE: 75 MMHG | OXYGEN SATURATION: 96 % | BODY MASS INDEX: 28.17 KG/M2 | WEIGHT: 208 LBS | TEMPERATURE: 97.9 F

## 2020-06-18 PROCEDURE — 4040F PNEUMOC VAC/ADMIN/RCVD: CPT | Performed by: PSYCHIATRY & NEUROLOGY

## 2020-06-18 PROCEDURE — G8417 CALC BMI ABV UP PARAM F/U: HCPCS | Performed by: PSYCHIATRY & NEUROLOGY

## 2020-06-18 PROCEDURE — 99214 OFFICE O/P EST MOD 30 MIN: CPT | Performed by: PSYCHIATRY & NEUROLOGY

## 2020-06-18 PROCEDURE — 1123F ACP DISCUSS/DSCN MKR DOCD: CPT | Performed by: PSYCHIATRY & NEUROLOGY

## 2020-06-18 PROCEDURE — 1036F TOBACCO NON-USER: CPT | Performed by: PSYCHIATRY & NEUROLOGY

## 2020-06-18 PROCEDURE — G8427 DOCREV CUR MEDS BY ELIG CLIN: HCPCS | Performed by: PSYCHIATRY & NEUROLOGY

## 2020-06-18 RX ORDER — LEVETIRACETAM 1000 MG/1
1500 TABLET ORAL 2 TIMES DAILY
Qty: 270 TABLET | Refills: 1 | Status: SHIPPED | OUTPATIENT
Start: 2020-06-18 | End: 2020-12-16 | Stop reason: SDUPTHER

## 2020-06-18 NOTE — PROGRESS NOTES
The patient came today for follow up regarding: Epilepsy, history of stroke and memory loss. Since his last visit, he denies any recent seizure. Last seizure could be in 2017. Description was brief motor seizure for few minutes and degree was severe. No aura or warning. No other associated symptoms. He denies any difficulty since he started taking Keppra. He is on 1500 mg twice daily. No side effect from Keppra. No recent falling, injury, fever, LOC or ARTI. No suicidal ideation or thoughts. He currently lives by himself since his wife passed away about 9 months ago. He continues to drink beer weekly but not daily. He denies recent falling or headache. No chest pain or visual changes. History of hypertension and hyperlipidemia. No recent change in his medications. He is on aspirin and statin. Continues to have intermittent short-term memory loss. Degree is moderate. He can be forgetful to daily events but able to manage without difficulties. He is on Razadyne 24 mg daily. No side effect of such medication. Occasional insomnia but no severe parasomnia or other sleep disorders. He denies any worsening depression, neck or back pain and other ROS was negative. Past Medical History:   Diagnosis Date    Ankle fracture, right     Arthritis     Diastolic murmur     Gout     Heart valve problem     2011-long term ,no problem with    Hyperlipidemia     Hypertension     Pacemaker      Prior to Visit Medications    Medication Sig Taking?  Authorizing Provider   levETIRAcetam (KEPPRA) 1000 MG tablet Take 1.5 tablets by mouth 2 times daily Yes Mohini Rose MD   atorvastatin (LIPITOR) 80 MG tablet Take 1 tablet by mouth nightly Yes Judy Mena MD   allopurinol (ZYLOPRIM) 300 MG tablet Take 300 mg by mouth daily Yes Historical Provider, MD   indomethacin (INDOCIN) 50 MG capsule Take 50 mg by mouth 2 times daily (with meals) Yes Historical Provider, MD   lisinopril

## 2020-11-03 PROBLEM — I10 HTN (HYPERTENSION): Status: RESOLVED | Noted: 2017-12-01 | Resolved: 2020-11-03

## 2020-12-16 ENCOUNTER — TELEPHONE (OUTPATIENT)
Dept: NEUROLOGY | Age: 80
End: 2020-12-16

## 2020-12-16 RX ORDER — GALANTAMINE HYDROBROMIDE 24 MG/1
24 CAPSULE, EXTENDED RELEASE ORAL
Qty: 90 CAPSULE | Refills: 0 | Status: SHIPPED | OUTPATIENT
Start: 2020-12-16 | End: 2021-06-22

## 2020-12-16 RX ORDER — LEVETIRACETAM 1000 MG/1
1500 TABLET ORAL 2 TIMES DAILY
Qty: 270 TABLET | Refills: 0 | Status: SHIPPED | OUTPATIENT
Start: 2020-12-16 | End: 2021-06-22 | Stop reason: SDUPTHER

## 2020-12-16 NOTE — TELEPHONE ENCOUNTER
Okay to refill Razadyne 24 mg and Keppra 1500 mg? Please advise. Patient called to cancel his appt for tomorrow, he is currently in [place] until March. Appointment was rs for 03.30.21, but he will be out of medication before then. Patient read medication names from bottle, but I do not see that you have written for the Razadyne. Last seen 06.18.20         Diagnosis Orders   1. Epilepsy, focal ([facility]  levETIRAcetam (KEPPRA) 1000 MG tablet   2. Seizure disorder (HCC)  levETIRAcetam (KEPPRA) 1000 MG tablet   3. Dementia due to Alzheimer's disease ([facility]      4. ETOH abuse      5. HTN (hypertension), benign      6.  Dyslipidemia          Plan:  Continue Keppra 1500 mg twice daily  6-month refill  Discussed seizure precaution, risk of falling and injury and driving restrictions  Continue Razadyne 24 mg daily for his dementia  Continue frequent walking and exercise  Aspirin  Statin  Counseled the patient again regarding risk of drinking including risk of falling and worsening dementia  Continue current blood pressure medication  Monitor blood pressure at home  Follow lipid panel  Continue current cognitive and behavioral therapy  Follow-up 6-month

## 2021-06-22 ENCOUNTER — OFFICE VISIT (OUTPATIENT)
Dept: NEUROLOGY | Age: 81
End: 2021-06-22
Payer: MEDICARE

## 2021-06-22 VITALS
BODY MASS INDEX: 28.17 KG/M2 | OXYGEN SATURATION: 95 % | SYSTOLIC BLOOD PRESSURE: 116 MMHG | WEIGHT: 208 LBS | HEART RATE: 83 BPM | HEIGHT: 72 IN | DIASTOLIC BLOOD PRESSURE: 78 MMHG

## 2021-06-22 DIAGNOSIS — F02.80 DEMENTIA DUE TO ALZHEIMER'S DISEASE (HCC): ICD-10-CM

## 2021-06-22 DIAGNOSIS — G30.9 DEMENTIA DUE TO ALZHEIMER'S DISEASE (HCC): ICD-10-CM

## 2021-06-22 DIAGNOSIS — I10 HTN (HYPERTENSION), BENIGN: ICD-10-CM

## 2021-06-22 DIAGNOSIS — G40.109 EPILEPSY, FOCAL (HCC): Primary | ICD-10-CM

## 2021-06-22 DIAGNOSIS — G40.909 SEIZURE DISORDER (HCC): ICD-10-CM

## 2021-06-22 PROCEDURE — 4040F PNEUMOC VAC/ADMIN/RCVD: CPT | Performed by: PSYCHIATRY & NEUROLOGY

## 2021-06-22 PROCEDURE — G8427 DOCREV CUR MEDS BY ELIG CLIN: HCPCS | Performed by: PSYCHIATRY & NEUROLOGY

## 2021-06-22 PROCEDURE — G8417 CALC BMI ABV UP PARAM F/U: HCPCS | Performed by: PSYCHIATRY & NEUROLOGY

## 2021-06-22 PROCEDURE — 99213 OFFICE O/P EST LOW 20 MIN: CPT | Performed by: PSYCHIATRY & NEUROLOGY

## 2021-06-22 PROCEDURE — 1123F ACP DISCUSS/DSCN MKR DOCD: CPT | Performed by: PSYCHIATRY & NEUROLOGY

## 2021-06-22 PROCEDURE — 1036F TOBACCO NON-USER: CPT | Performed by: PSYCHIATRY & NEUROLOGY

## 2021-06-22 RX ORDER — LEVETIRACETAM 1000 MG/1
1500 TABLET ORAL 2 TIMES DAILY
Qty: 270 TABLET | Refills: 1 | Status: SHIPPED | OUTPATIENT
Start: 2021-06-22 | End: 2021-12-07

## 2021-06-22 NOTE — PROGRESS NOTES
The patient came today for follow up regarding: Epilepsy, history of stroke and memory loss. Since his last visit, he denies any recent seizure. Last seizure could be in 2017. Description was brief motor seizure. He is on Keppra 1500 mg x 2. No side effect from Keppra. No suicidal ideation or thoughts or behavior issues. He had his blood test recently with PCP oh less than 6-month ago. According to his granddaughter, blood tests were normal.    He denies any worsening of his memory. He stopped taking Razadyne due to insurance issue. He denies any falling, injury, hallucination or sleep issues. He continues to be independent in his daily living. He only drinks socially. Not daily. He quit smoking. He takes the same blood pressure medicine. Other review of system was unremarkable. .    Past Medical History:   Diagnosis Date    Ankle fracture, right     Arthritis     Diastolic murmur     Gout     Heart valve problem     -long term ,no problem with    Hyperlipidemia     Hypertension     Pacemaker      Prior to Visit Medications    Medication Sig Taking?  Authorizing Provider   levETIRAcetam (KEPPRA) 1000 MG tablet Take 1.5 tablets by mouth 2 times daily Yes Matthieu Ivy MD   atorvastatin (LIPITOR) 80 MG tablet Take 1 tablet by mouth nightly Yes Claudia Thompson MD   allopurinol (ZYLOPRIM) 300 MG tablet Take 300 mg by mouth daily Yes Historical Provider, MD   indomethacin (INDOCIN) 50 MG capsule Take 50 mg by mouth 2 times daily (with meals) Yes Historical Provider, MD   lisinopril (PRINIVIL;ZESTRIL) 10 MG tablet Take 10 mg by mouth daily Yes Historical Provider, MD   amLODIPine (NORVASC) 5 MG tablet Take 5 mg by mouth daily Yes Historical Provider, MD     No Known Allergies  Social History     Tobacco Use    Smoking status: Former Smoker     Quit date: 2003     Years since quittin.6    Smokeless tobacco: Former User    Tobacco comment: cigar smoker   Substance Use Topics    Alcohol use: Yes     Alcohol/week: 2.0 - 3.0 standard drinks     Types: 2 - 3 Cans of beer per week     Comment: 3 week      Family History   Problem Relation Age of Onset    Heart Disease Mother     Heart Disease Father      Past Surgical History:   Procedure Laterality Date    ANKLE SURGERY      RT    APPENDECTOMY      CHOLECYSTECTOMY      EYE SURGERY      bilateral cataracts    HERNIA REPAIR      rihr  x 2    KNEE ARTHROSCOPY Right 11/19/2013    KNEE SURGERY      QUADRACEPS TENDON REPAIR Left 7/17/15    SHOULDER ARTHROSCOPY  9/20/11    video arthroscopy shoulder debridement acromioplasty         Exam:   Constitutional:   Vitals:    06/22/21 1250   BP: 116/78   Pulse: 83   SpO2: 95%   Weight: 208 lb (94.3 kg)   Height: 6' (1.829 m)       General appearance: well-nourished. Mental Status:   Oriented to person, place, problem, and time. Fluent speech. Poor fund of knowledge. Normal attention span and concentration. Intact recent and remote memory. Cranial Nerves:   II: Pupils: equal, round, reactive to light  III,IV,VI: Extra Ocular Movements are intact. No nystagmus  V: Facial sensation is intact  VII: Facial strength and movements: intact and symmetric  XII: Tongue movements are normal  Musculoskeletal: 5/5 in all 4 extremities. Normal tone. Coordination: no pronator drift, no dysmetria. Sensation: normal to all modalities. Gait/Posture: steady        ROS : A 10-12 system review of constitutional, cardiovascular, respiratory, musculoskeletal, endocrine, hematological, skin, SHEENT, genitourinary, psychiatric and neurologic systems was obtained and updated today which is unremarkable except as mentioned in my HPI  The same      Medical decision making:  I personally reviewed and updated social history, past medical history, medications, allergy, surgical history, and family history as documented in the patient's electronic health records.      Reviewed notes from different physician  Obtain history from his family        Diagnosis Orders   1. Epilepsy, focal ([facility]  levETIRAcetam (KEPPRA) 1000 MG tablet   2. Seizure disorder (HCC)  levETIRAcetam (KEPPRA) 1000 MG tablet   3.  Dementia due to Alzheimer's disease ([facility]           Plan:  Continue Keppra 1500 mg twice daily  6-month refill  Discussed side effect, risk of benefits of decreasing Keppra as well as seizure precautions  Continue current cognitive therapy for his memory impairment  Discussed risk of alcohol abuse  Continue current blood pressure medications  Fall precautions  No need to start new medication for memory unless symptoms worsen  Improving sleep hygiene  Follow-up 6-month

## 2021-12-07 DIAGNOSIS — G40.909 SEIZURE DISORDER (HCC): ICD-10-CM

## 2021-12-07 DIAGNOSIS — G40.109 EPILEPSY, FOCAL (HCC): ICD-10-CM

## 2021-12-07 RX ORDER — LEVETIRACETAM 1000 MG/1
TABLET ORAL
Qty: 270 TABLET | Refills: 1 | Status: SHIPPED | OUTPATIENT
Start: 2021-12-07 | End: 2022-08-04 | Stop reason: SDUPTHER

## 2021-12-07 NOTE — TELEPHONE ENCOUNTER
Refill request for keppra medication.      Name of Jaylin      Last visit - 6/22/2021     Pending visit - 5/3/2022    Last refill -6/22/2021  1 refill

## 2022-08-04 ENCOUNTER — OFFICE VISIT (OUTPATIENT)
Dept: NEUROLOGY | Age: 82
End: 2022-08-04
Payer: MEDICARE

## 2022-08-04 VITALS
HEART RATE: 65 BPM | DIASTOLIC BLOOD PRESSURE: 55 MMHG | BODY MASS INDEX: 27.77 KG/M2 | WEIGHT: 205 LBS | OXYGEN SATURATION: 96 % | SYSTOLIC BLOOD PRESSURE: 113 MMHG | HEIGHT: 72 IN

## 2022-08-04 DIAGNOSIS — G30.9 DEMENTIA DUE TO ALZHEIMER'S DISEASE (HCC): ICD-10-CM

## 2022-08-04 DIAGNOSIS — G40.109 EPILEPSY, FOCAL (HCC): Primary | ICD-10-CM

## 2022-08-04 DIAGNOSIS — F02.80 DEMENTIA DUE TO ALZHEIMER'S DISEASE (HCC): ICD-10-CM

## 2022-08-04 DIAGNOSIS — I10 HTN (HYPERTENSION), BENIGN: ICD-10-CM

## 2022-08-04 DIAGNOSIS — G40.909 SEIZURE DISORDER (HCC): ICD-10-CM

## 2022-08-04 PROCEDURE — 1123F ACP DISCUSS/DSCN MKR DOCD: CPT | Performed by: PSYCHIATRY & NEUROLOGY

## 2022-08-04 PROCEDURE — G8417 CALC BMI ABV UP PARAM F/U: HCPCS | Performed by: PSYCHIATRY & NEUROLOGY

## 2022-08-04 PROCEDURE — 1036F TOBACCO NON-USER: CPT | Performed by: PSYCHIATRY & NEUROLOGY

## 2022-08-04 PROCEDURE — G8427 DOCREV CUR MEDS BY ELIG CLIN: HCPCS | Performed by: PSYCHIATRY & NEUROLOGY

## 2022-08-04 PROCEDURE — 99214 OFFICE O/P EST MOD 30 MIN: CPT | Performed by: PSYCHIATRY & NEUROLOGY

## 2022-08-04 RX ORDER — LEVETIRACETAM 1000 MG/1
TABLET ORAL
Qty: 270 TABLET | Refills: 1 | Status: SHIPPED | OUTPATIENT
Start: 2022-08-04

## 2022-08-04 RX ORDER — ASPIRIN 81 MG/1
1 TABLET ORAL DAILY
COMMUNITY
Start: 2022-07-19

## 2022-08-04 NOTE — PROGRESS NOTES
The patient came today for follow up regarding: Epilepsy, history of stroke and memory loss. The patient came with his daughter today  No seizure since his last visit. Last seizure was in 2017. On the same dose of Keppra 300 mg/day. No side effect of such medication. He lives by himself but has good family support. No recent falling or injury. No major sleep insomnia or parasomnia. No behavior changes or side effect from Keppra    Occasional episodic short-term memory loss. Degree is moderate but not severe. No hallucination or severe depression. He is independent. Elana Morgan He is on the same blood pressure medicine. He takes statin. Recent blood test from last month reviewed. Other review of system was unremarkable. Past Medical History:   Diagnosis Date    Ankle fracture, right     Arthritis     Diastolic murmur     Gout     Heart valve problem     -long term ,no problem with    Hyperlipidemia     Hypertension     Pacemaker      Prior to Visit Medications    Medication Sig Taking? Authorizing Provider   levETIRAcetam (KEPPRA) 1000 MG tablet TAKE ONE AND ONE-HALF TABLET BY MOUTH TWICE DAILY Yes Zaida Schmitz MD   aspirin 81 MG EC tablet Take 1 tablet by mouth in the morning.  Yes Historical Provider, MD   atorvastatin (LIPITOR) 80 MG tablet Take 1 tablet by mouth nightly Yes Christina Naranjo MD   allopurinol (ZYLOPRIM) 300 MG tablet Take 300 mg by mouth daily Yes Historical Provider, MD   lisinopril (PRINIVIL;ZESTRIL) 10 MG tablet Take 10 mg by mouth daily Yes Historical Provider, MD   indomethacin (INDOCIN) 50 MG capsule Take 50 mg by mouth 2 times daily (with meals)  Patient not taking: Reported on 2022  Historical Provider, MD     No Known Allergies  Social History     Tobacco Use    Smoking status: Former     Types: Cigarettes     Quit date: 2003     Years since quittin.7    Smokeless tobacco: Former    Tobacco comments:     cigar smoker   Substance Use Topics Alcohol use: Yes     Alcohol/week: 2.0 - 3.0 standard drinks     Types: 2 - 3 Cans of beer per week     Comment: 3 week      Family History   Problem Relation Age of Onset    Heart Disease Mother     Heart Disease Father      Past Surgical History:   Procedure Laterality Date    ANKLE SURGERY      RT    APPENDECTOMY      CARDIAC VALVE REPLACEMENT  07/18/2022    CHOLECYSTECTOMY      EYE SURGERY      bilateral cataracts    HERNIA REPAIR      rihr  x 2    KNEE ARTHROSCOPY Right 11/19/2013    KNEE SURGERY      QUADRACEPS TENDON REPAIR Left 07/17/2015    SHOULDER ARTHROSCOPY  09/20/2011    video arthroscopy shoulder debridement acromioplasty         Exam:   Constitutional:   Vitals:    08/04/22 1219   BP: (!) 113/55   Site: Right Wrist   Position: Sitting   Pulse: 65   SpO2: 96%   Weight: 205 lb (93 kg)   Height: 6' (1.829 m)       General appearance: well-nourished. Mental Status:   Oriented to person, place, problem, and time. Fluent speech. Poor fund of knowledge. Normal attention span and concentration. Intact recent and remote memory. Cranial Nerves:   II: Pupils: equal, round, reactive to light  III,IV,VI: Extra Ocular Movements are intact. No nystagmus  V: Facial sensation is intact  VII: Facial strength and movements: intact and symmetric  XII: Tongue movements are normal  Musculoskeletal: 5/5 in all 4 extremities. Normal tone. Coordination: no pronator drift, no dysmetria. Sensation: normal to all modalities.   Gait/Posture: steady        ROS : A 10-12 system review of constitutional, cardiovascular, respiratory, musculoskeletal, endocrine, hematological, skin, SHEENT, genitourinary, psychiatric and neurologic systems was obtained and updated today which is unremarkable except as mentioned in my HPI  The same      Medical decision making:  I personally reviewed and updated social history, past medical history, medications, allergy, surgical history, and family history as documented in the patient's electronic health records. Reviewed notes from different physician  Obtained history from his family  Reviewed blood test with daughter from last month        Diagnosis Orders   1. Epilepsy, focal ([address]  levETIRAcetam (KEPPRA) 1000 MG tablet    Levetiracetam Level      2. Seizure disorder (HCC)  levETIRAcetam (KEPPRA) 1000 MG tablet      3. Dementia due to Alzheimer's disease ([address]        4. HTN (hypertension), benign              Plan:  Continue Keppra 1500 mg twice daily  Check Keppra level  6-month refill for Keppra  Discussed side effect of Keppra with the patient and his daughter. Will consider decreasing Keppra dose if he continues to be seizure-free.   Cognitive therapy addressed  Monitor blood pressure on current medications  Aspirin  Statin  Improving sleep hygiene  Driving precautions  Follow-up 6-month

## 2023-12-30 ENCOUNTER — APPOINTMENT (OUTPATIENT)
Dept: CT IMAGING | Age: 83
End: 2023-12-30
Payer: MEDICARE

## 2023-12-30 ENCOUNTER — APPOINTMENT (OUTPATIENT)
Dept: GENERAL RADIOLOGY | Age: 83
End: 2023-12-30
Payer: MEDICARE

## 2023-12-30 ENCOUNTER — HOSPITAL ENCOUNTER (INPATIENT)
Age: 83
LOS: 5 days | Discharge: NURSING, REHAB OR LONG-TERM CARE | DRG: 101 | End: 2024-01-04
Attending: PSYCHIATRY & NEUROLOGY | Admitting: PSYCHIATRY & NEUROLOGY
Payer: MEDICARE

## 2023-12-30 ENCOUNTER — HOSPITAL ENCOUNTER (EMERGENCY)
Age: 83
Discharge: ANOTHER HOSPITAL | End: 2023-12-30
Attending: STUDENT IN AN ORGANIZED HEALTH CARE EDUCATION/TRAINING PROGRAM
Payer: MEDICARE

## 2023-12-30 VITALS
SYSTOLIC BLOOD PRESSURE: 115 MMHG | OXYGEN SATURATION: 99 % | HEART RATE: 85 BPM | HEIGHT: 72 IN | WEIGHT: 190.5 LBS | RESPIRATION RATE: 14 BRPM | DIASTOLIC BLOOD PRESSURE: 77 MMHG | TEMPERATURE: 97.8 F | BODY MASS INDEX: 25.8 KG/M2

## 2023-12-30 DIAGNOSIS — G40.209 COMPLEX PARTIAL SEIZURE DISORDER (HCC): Primary | ICD-10-CM

## 2023-12-30 DIAGNOSIS — R41.82 ALTERED MENTAL STATUS, UNSPECIFIED ALTERED MENTAL STATUS TYPE: ICD-10-CM

## 2023-12-30 DIAGNOSIS — R56.9 SEIZURES (HCC): Primary | ICD-10-CM

## 2023-12-30 PROBLEM — G40.919 BREAKTHROUGH SEIZURE (HCC): Status: ACTIVE | Noted: 2017-12-01

## 2023-12-30 LAB
ALBUMIN SERPL-MCNC: 4.5 G/DL (ref 3.4–5)
ALBUMIN/GLOB SERPL: 1.1 {RATIO} (ref 1.1–2.2)
ALP SERPL-CCNC: 105 U/L (ref 40–129)
ALT SERPL-CCNC: 23 U/L (ref 10–40)
AMPHETAMINES UR QL SCN>1000 NG/ML: NORMAL
ANION GAP SERPL CALCULATED.3IONS-SCNC: 27 MMOL/L (ref 3–16)
AST SERPL-CCNC: 39 U/L (ref 15–37)
BACTERIA URNS QL MICRO: ABNORMAL /HPF
BARBITURATES UR QL SCN>200 NG/ML: NORMAL
BASOPHILS # BLD: 0 K/UL (ref 0–0.2)
BASOPHILS NFR BLD: 0.6 %
BENZODIAZ UR QL SCN>200 NG/ML: NORMAL
BILIRUB SERPL-MCNC: 0.5 MG/DL (ref 0–1)
BILIRUB UR QL STRIP.AUTO: NEGATIVE
BUN SERPL-MCNC: 16 MG/DL (ref 7–20)
CALCIUM SERPL-MCNC: 10.1 MG/DL (ref 8.3–10.6)
CANNABINOIDS UR QL SCN>50 NG/ML: NORMAL
CHLORIDE SERPL-SCNC: 101 MMOL/L (ref 99–110)
CHP ED QC CHECK: 98
CLARITY UR: CLEAR
CO2 SERPL-SCNC: 15 MMOL/L (ref 21–32)
COCAINE UR QL SCN: NORMAL
COLOR UR: YELLOW
CREAT SERPL-MCNC: 1 MG/DL (ref 0.8–1.3)
DEPRECATED RDW RBC AUTO: 14.1 % (ref 12.4–15.4)
DRUG SCREEN COMMENT UR-IMP: NORMAL
EKG ATRIAL RATE: 90 BPM
EKG DIAGNOSIS: NORMAL
EKG P AXIS: 40 DEGREES
EKG P-R INTERVAL: 208 MS
EKG Q-T INTERVAL: 460 MS
EKG QRS DURATION: 190 MS
EKG QTC CALCULATION (BAZETT): 562 MS
EKG R AXIS: -72 DEGREES
EKG T AXIS: 87 DEGREES
EKG VENTRICULAR RATE: 90 BPM
EOSINOPHIL # BLD: 0.3 K/UL (ref 0–0.6)
EOSINOPHIL NFR BLD: 4.1 %
EPI CELLS #/AREA URNS HPF: ABNORMAL /HPF (ref 0–5)
ETHANOLAMINE SERPL-MCNC: NORMAL MG/DL (ref 0–0.08)
FENTANYL SCREEN, URINE: NORMAL
GFR SERPLBLD CREATININE-BSD FMLA CKD-EPI: >60 ML/MIN/{1.73_M2}
GLUCOSE BLD-MCNC: 98 MG/DL (ref 70–99)
GLUCOSE SERPL-MCNC: 135 MG/DL (ref 70–99)
GLUCOSE UR STRIP.AUTO-MCNC: NEGATIVE MG/DL
HCT VFR BLD AUTO: 38.9 % (ref 40.5–52.5)
HGB BLD-MCNC: 12.6 G/DL (ref 13.5–17.5)
HGB UR QL STRIP.AUTO: NEGATIVE
INR PPP: 1.09 (ref 0.84–1.16)
KETONES UR STRIP.AUTO-MCNC: NEGATIVE MG/DL
LACTATE BLDV-SCNC: 11.6 MMOL/L (ref 0.4–2)
LEUKOCYTE ESTERASE UR QL STRIP.AUTO: NEGATIVE
LEVETIRACETAM SERPL-MCNC: 42 UG/ML (ref 6–46)
LYMPHOCYTES # BLD: 2.4 K/UL (ref 1–5.1)
LYMPHOCYTES NFR BLD: 38.2 %
MAGNESIUM SERPL-MCNC: 2 MG/DL (ref 1.8–2.4)
MCH RBC QN AUTO: 33.5 PG (ref 26–34)
MCHC RBC AUTO-ENTMCNC: 32.3 G/DL (ref 31–36)
MCV RBC AUTO: 103.5 FL (ref 80–100)
MEDICATION DOSE-MCNC: NORMAL
METHADONE UR QL SCN>300 NG/ML: NORMAL
MONOCYTES # BLD: 0.7 K/UL (ref 0–1.3)
MONOCYTES NFR BLD: 11.5 %
NEUTROPHILS # BLD: 2.9 K/UL (ref 1.7–7.7)
NEUTROPHILS NFR BLD: 45.6 %
NITRITE UR QL STRIP.AUTO: NEGATIVE
OPIATES UR QL SCN>300 NG/ML: NORMAL
OXYCODONE UR QL SCN: NORMAL
PCP UR QL SCN>25 NG/ML: NORMAL
PERFORMED ON: NORMAL
PH UR STRIP.AUTO: 6 [PH] (ref 5–8)
PH UR STRIP: 6 [PH]
PLATELET # BLD AUTO: 91 K/UL (ref 135–450)
PLATELET BLD QL SMEAR: ABNORMAL
PMV BLD AUTO: 8.6 FL (ref 5–10.5)
POTASSIUM SERPL-SCNC: 3.4 MMOL/L (ref 3.5–5.1)
PROT SERPL-MCNC: 8.6 G/DL (ref 6.4–8.2)
PROT UR STRIP.AUTO-MCNC: 30 MG/DL
PROTHROMBIN TIME: 14.1 SEC (ref 11.5–14.8)
RBC # BLD AUTO: 3.76 M/UL (ref 4.2–5.9)
RBC #/AREA URNS HPF: ABNORMAL /HPF (ref 0–4)
SLIDE REVIEW: ABNORMAL
SODIUM SERPL-SCNC: 143 MMOL/L (ref 136–145)
SP GR UR STRIP.AUTO: 1.01 (ref 1–1.03)
TROPONIN, HIGH SENSITIVITY: 62 NG/L (ref 0–22)
TROPONIN, HIGH SENSITIVITY: 68 NG/L (ref 0–22)
UA COMPLETE W REFLEX CULTURE PNL UR: ABNORMAL
UA DIPSTICK W REFLEX MICRO PNL UR: YES
URN SPEC COLLECT METH UR: ABNORMAL
UROBILINOGEN UR STRIP-ACNC: 0.2 E.U./DL
WBC # BLD AUTO: 6.4 K/UL (ref 4–11)
WBC #/AREA URNS HPF: ABNORMAL /HPF (ref 0–5)

## 2023-12-30 PROCEDURE — 85610 PROTHROMBIN TIME: CPT

## 2023-12-30 PROCEDURE — 80177 DRUG SCRN QUAN LEVETIRACETAM: CPT

## 2023-12-30 PROCEDURE — 84484 ASSAY OF TROPONIN QUANT: CPT

## 2023-12-30 PROCEDURE — 83735 ASSAY OF MAGNESIUM: CPT

## 2023-12-30 PROCEDURE — 2580000003 HC RX 258: Performed by: STUDENT IN AN ORGANIZED HEALTH CARE EDUCATION/TRAINING PROGRAM

## 2023-12-30 PROCEDURE — 80053 COMPREHEN METABOLIC PANEL: CPT

## 2023-12-30 PROCEDURE — 6360000002 HC RX W HCPCS

## 2023-12-30 PROCEDURE — 2060000000 HC ICU INTERMEDIATE R&B

## 2023-12-30 PROCEDURE — 93005 ELECTROCARDIOGRAM TRACING: CPT | Performed by: STUDENT IN AN ORGANIZED HEALTH CARE EDUCATION/TRAINING PROGRAM

## 2023-12-30 PROCEDURE — 70450 CT HEAD/BRAIN W/O DYE: CPT

## 2023-12-30 PROCEDURE — 70498 CT ANGIOGRAPHY NECK: CPT

## 2023-12-30 PROCEDURE — 71045 X-RAY EXAM CHEST 1 VIEW: CPT

## 2023-12-30 PROCEDURE — 85025 COMPLETE CBC W/AUTO DIFF WBC: CPT

## 2023-12-30 PROCEDURE — 6360000002 HC RX W HCPCS: Performed by: STUDENT IN AN ORGANIZED HEALTH CARE EDUCATION/TRAINING PROGRAM

## 2023-12-30 PROCEDURE — 99223 1ST HOSP IP/OBS HIGH 75: CPT | Performed by: PSYCHIATRY & NEUROLOGY

## 2023-12-30 PROCEDURE — 6360000002 HC RX W HCPCS: Performed by: PSYCHIATRY & NEUROLOGY

## 2023-12-30 PROCEDURE — 2500000003 HC RX 250 WO HCPCS: Performed by: STUDENT IN AN ORGANIZED HEALTH CARE EDUCATION/TRAINING PROGRAM

## 2023-12-30 PROCEDURE — 82077 ASSAY SPEC XCP UR&BREATH IA: CPT

## 2023-12-30 PROCEDURE — 99285 EMERGENCY DEPT VISIT HI MDM: CPT

## 2023-12-30 PROCEDURE — 93010 ELECTROCARDIOGRAM REPORT: CPT | Performed by: INTERNAL MEDICINE

## 2023-12-30 PROCEDURE — 80307 DRUG TEST PRSMV CHEM ANLYZR: CPT

## 2023-12-30 PROCEDURE — 6360000004 HC RX CONTRAST MEDICATION: Performed by: STUDENT IN AN ORGANIZED HEALTH CARE EDUCATION/TRAINING PROGRAM

## 2023-12-30 PROCEDURE — 6370000000 HC RX 637 (ALT 250 FOR IP): Performed by: STUDENT IN AN ORGANIZED HEALTH CARE EDUCATION/TRAINING PROGRAM

## 2023-12-30 PROCEDURE — 81001 URINALYSIS AUTO W/SCOPE: CPT

## 2023-12-30 PROCEDURE — 83605 ASSAY OF LACTIC ACID: CPT

## 2023-12-30 RX ORDER — LEVETIRACETAM 500 MG/5ML
1500 INJECTION, SOLUTION, CONCENTRATE INTRAVENOUS EVERY 12 HOURS
Status: DISCONTINUED | OUTPATIENT
Start: 2023-12-30 | End: 2024-01-03

## 2023-12-30 RX ORDER — SODIUM CHLORIDE, SODIUM LACTATE, POTASSIUM CHLORIDE, CALCIUM CHLORIDE 600; 310; 30; 20 MG/100ML; MG/100ML; MG/100ML; MG/100ML
INJECTION, SOLUTION INTRAVENOUS CONTINUOUS
Status: ACTIVE | OUTPATIENT
Start: 2023-12-30 | End: 2024-01-01

## 2023-12-30 RX ORDER — POTASSIUM CHLORIDE 7.45 MG/ML
10 INJECTION INTRAVENOUS PRN
Status: DISCONTINUED | OUTPATIENT
Start: 2023-12-30 | End: 2024-01-04 | Stop reason: HOSPADM

## 2023-12-30 RX ORDER — POTASSIUM CHLORIDE 20 MEQ/1
40 TABLET, EXTENDED RELEASE ORAL PRN
Status: DISCONTINUED | OUTPATIENT
Start: 2023-12-30 | End: 2024-01-04 | Stop reason: HOSPADM

## 2023-12-30 RX ORDER — ONDANSETRON 4 MG/1
4 TABLET, ORALLY DISINTEGRATING ORAL EVERY 8 HOURS PRN
Status: DISCONTINUED | OUTPATIENT
Start: 2023-12-30 | End: 2023-12-30

## 2023-12-30 RX ORDER — SODIUM CHLORIDE 9 MG/ML
INJECTION, SOLUTION INTRAVENOUS PRN
Status: DISCONTINUED | OUTPATIENT
Start: 2023-12-30 | End: 2024-01-04 | Stop reason: HOSPADM

## 2023-12-30 RX ORDER — MAGNESIUM SULFATE IN WATER 40 MG/ML
2000 INJECTION, SOLUTION INTRAVENOUS PRN
Status: DISCONTINUED | OUTPATIENT
Start: 2023-12-30 | End: 2024-01-04 | Stop reason: HOSPADM

## 2023-12-30 RX ORDER — MULTIVIT-MIN/IRON/FOLIC ACID/K 18-600-40
2000 CAPSULE ORAL DAILY
COMMUNITY

## 2023-12-30 RX ORDER — LORAZEPAM 2 MG/ML
2 CONCENTRATE ORAL ONCE
Status: DISCONTINUED | OUTPATIENT
Start: 2023-12-30 | End: 2023-12-30 | Stop reason: HOSPADM

## 2023-12-30 RX ORDER — PROCHLORPERAZINE MALEATE 10 MG
5 TABLET ORAL EVERY 6 HOURS PRN
Status: DISCONTINUED | OUTPATIENT
Start: 2023-12-30 | End: 2024-01-04 | Stop reason: HOSPADM

## 2023-12-30 RX ORDER — POLYETHYLENE GLYCOL 3350 17 G/17G
17 POWDER, FOR SOLUTION ORAL DAILY PRN
Status: DISCONTINUED | OUTPATIENT
Start: 2023-12-30 | End: 2024-01-04 | Stop reason: HOSPADM

## 2023-12-30 RX ORDER — ACETAMINOPHEN 650 MG/1
650 SUPPOSITORY RECTAL EVERY 6 HOURS PRN
Status: DISCONTINUED | OUTPATIENT
Start: 2023-12-30 | End: 2024-01-04 | Stop reason: HOSPADM

## 2023-12-30 RX ORDER — SODIUM CHLORIDE 0.9 % (FLUSH) 0.9 %
5-40 SYRINGE (ML) INJECTION EVERY 12 HOURS SCHEDULED
Status: DISCONTINUED | OUTPATIENT
Start: 2023-12-30 | End: 2024-01-04 | Stop reason: HOSPADM

## 2023-12-30 RX ORDER — M-VIT,TX,IRON,MINS/CALC/FOLIC 27MG-0.4MG
1 TABLET ORAL DAILY
COMMUNITY

## 2023-12-30 RX ORDER — ACETAMINOPHEN 325 MG/1
650 TABLET ORAL EVERY 6 HOURS PRN
Status: DISCONTINUED | OUTPATIENT
Start: 2023-12-30 | End: 2024-01-04 | Stop reason: HOSPADM

## 2023-12-30 RX ORDER — PROCHLORPERAZINE EDISYLATE 5 MG/ML
10 INJECTION INTRAMUSCULAR; INTRAVENOUS EVERY 6 HOURS PRN
Status: DISCONTINUED | OUTPATIENT
Start: 2023-12-30 | End: 2024-01-04 | Stop reason: HOSPADM

## 2023-12-30 RX ORDER — SODIUM CHLORIDE 0.9 % (FLUSH) 0.9 %
5-40 SYRINGE (ML) INJECTION PRN
Status: DISCONTINUED | OUTPATIENT
Start: 2023-12-30 | End: 2024-01-04 | Stop reason: HOSPADM

## 2023-12-30 RX ORDER — TAMSULOSIN HYDROCHLORIDE 0.4 MG/1
0.4 CAPSULE ORAL DAILY
COMMUNITY

## 2023-12-30 RX ORDER — 0.9 % SODIUM CHLORIDE 0.9 %
1000 INTRAVENOUS SOLUTION INTRAVENOUS ONCE
Status: COMPLETED | OUTPATIENT
Start: 2023-12-30 | End: 2023-12-30

## 2023-12-30 RX ORDER — FINASTERIDE 5 MG/1
5 TABLET, FILM COATED ORAL DAILY
COMMUNITY

## 2023-12-30 RX ORDER — ONDANSETRON 2 MG/ML
4 INJECTION INTRAMUSCULAR; INTRAVENOUS EVERY 6 HOURS PRN
Status: DISCONTINUED | OUTPATIENT
Start: 2023-12-30 | End: 2023-12-30

## 2023-12-30 RX ORDER — ENOXAPARIN SODIUM 100 MG/ML
40 INJECTION SUBCUTANEOUS DAILY
Status: DISCONTINUED | OUTPATIENT
Start: 2023-12-30 | End: 2024-01-04 | Stop reason: HOSPADM

## 2023-12-30 RX ORDER — LORAZEPAM 2 MG/ML
INJECTION INTRAMUSCULAR
Status: COMPLETED
Start: 2023-12-30 | End: 2023-12-30

## 2023-12-30 RX ADMIN — ACETAMINOPHEN 650 MG: 325 TABLET ORAL at 19:34

## 2023-12-30 RX ADMIN — LEVETIRACETAM 1500 MG: 500 INJECTION, SOLUTION, CONCENTRATE INTRAVENOUS at 19:34

## 2023-12-30 RX ADMIN — LEVETIRACETAM 1500 MG: 100 INJECTION, SOLUTION INTRAVENOUS at 08:29

## 2023-12-30 RX ADMIN — IOPAMIDOL 75 ML: 755 INJECTION, SOLUTION INTRAVENOUS at 08:26

## 2023-12-30 RX ADMIN — SODIUM CHLORIDE, PRESERVATIVE FREE 10 ML: 5 INJECTION INTRAVENOUS at 19:35

## 2023-12-30 RX ADMIN — SODIUM CHLORIDE, POTASSIUM CHLORIDE, SODIUM LACTATE AND CALCIUM CHLORIDE: 600; 310; 30; 20 INJECTION, SOLUTION INTRAVENOUS at 15:05

## 2023-12-30 RX ADMIN — SODIUM BICARBONATE 50 MEQ: 84 INJECTION, SOLUTION INTRAVENOUS at 08:45

## 2023-12-30 RX ADMIN — LORAZEPAM 2 MG: 2 INJECTION, SOLUTION INTRAMUSCULAR; INTRAVENOUS at 08:15

## 2023-12-30 RX ADMIN — ENOXAPARIN SODIUM 40 MG: 100 INJECTION SUBCUTANEOUS at 15:05

## 2023-12-30 RX ADMIN — SODIUM CHLORIDE 1000 ML: 9 INJECTION, SOLUTION INTRAVENOUS at 08:43

## 2023-12-30 NOTE — ED NOTES
1511 - called [facility] [address] [facility] neurology per consult  Re: seizures, transfer vs admit to [facility] - Dr Steve Cramer on the line to speak with Dr Cade Lobato  9610 - called "[employer] access and spoke with DELROY SANTOYO to reach neurology at [address] - LEXIS Galvan (neuro) called back via "[employer] access to speak with Dr Cade Lobato. Dr Chu Bhatti is the admitting attending at [facility].  Awaiting on a bed assignment  1030 - saw bed is assigned, requested ALS transport in [employer]    BED: [address] [facility]  N2N: [phone]  Transport: [facility] @ [address] [place] [address]

## 2023-12-30 NOTE — PLAN OF CARE
Problem: Safety - Adult  Goal: Free from fall injury  Outcome: Progressing  All fall precautions in place. Bed locked and in lowest position with alarm on. Overbed table and personal belonings within reach. Call light within reach and patient instructed to use call light for assistance. Non-skid socks on.      Problem: ABCDS Injury Assessment  Goal: Absence of physical injury  Outcome: Progressing  Q2 turns with frequent repositioning utilized to promote and maintain skin integrity.  No injuries noted on this shift.

## 2023-12-30 NOTE — CONSULTS
Neurology Consultation Note      Patient: Vipul Sarabia MRN: [id number]    YOB: 1940  Age: 83 y.o.  Sex: male   Unit: [facility] 5T ORTHO/NEURO Room/Bed: 5505/5505-01 Location: [facility]    Date of Consultation: 12/30/2023  Date of Admission: 12/30/2023 12:03 PM ( LOS: 0 days )  Admitting Physician:     Primary Care Physician: Joel Aj MD   Consult Requested By: Emeterio Romano MD      Reason for Consult: \"Seizure\"    ASSESSMENT & RECOMMENDATIONS     Assessment  82yo man with known seizure disorder (presumably complex partial), diagnosed in 2017 and had been on Keppra 1500/1500 at home until March of this year (~9 months ago), which was stopped in hopes he did not require it any longer, presented to ER with likely 3 or more complex partial seizures and is now post-ictal/post benzodiazepine  His typical course after his seizures, according to his granddaughter who knows him well, is to be postictal for some time  Given that he has had more seizures today than usual and also received benzodiazepines as well, it is not concerning that he has not returned to baseline yet  Do not feel that there is indication for cvEEG at this time given that he will answer some simple questions and follow a few commans  If, of course, he does not improve to baseline over the course of today, will need to consider cvEEG  He is using his left side less than his right side and family feels this is improved from what they noticed this morning; cannot exclude stroke as the cause of this weakness but with inability to get MRI (incompatible pacemaker), will need to monitor this for improvement and consider options for further workup if indicated  Seeing that he was off his Keppra entirely when these seizures (including the events noted over the last month) occurred, no reason to believe that restarting it will not fully address the issue    Recommendations  Restart previous home dose Keppra 1500mg  at 08:15 and Keppra 1500mg at 08:29. He did not return to baseline. Stroke team was called and thrombolytics were not recommended.    Family reports that he has not had any convulsive types of seizures that they are aware of. His seizures are always the staring kind or simply unresponsive with eyes closed.    Currently, he is very somnolent but will respond to gentle noxious stim. He will state his name when asked and minimally follows commands to show his thumbs (right > left)    Past Medical History:   has a past medical history of Ankle fracture, right, Arthritis, Diastolic murmur, Gout, Heart valve problem, Hyperlipidemia, Hypertension, and Pacemaker.    Past Surgical History:  Past Surgical History:   Procedure Laterality Date    ANKLE SURGERY      RT    APPENDECTOMY      CARDIAC VALVE REPLACEMENT  07/18/2022    CHOLECYSTECTOMY      EYE SURGERY      bilateral cataracts    HERNIA REPAIR      rihr  x 2    KNEE ARTHROSCOPY Right 11/19/2013    KNEE SURGERY      QUADRACEPS TENDON REPAIR Left 07/17/2015    SHOULDER ARTHROSCOPY  09/20/2011    video arthroscopy shoulder debridement acromioplasty       Family History:  family history includes Heart Disease in his father and mother.    Social History:  he reports that he quit smoking about 20 years ago. His smoking use included cigarettes. He has quit using smokeless tobacco. He reports current alcohol use of about 2.0 - 3.0 standard drinks of alcohol per week. He reports that he does not use drugs.    Medications:  No Known Allergies    Medications Prior to Admission: tamsulosin (FLOMAX) 0.4 MG capsule, Take 1 capsule by mouth daily nightly  finasteride (PROSCAR) 5 MG tablet, Take 1 tablet by mouth daily nightly  Multiple Vitamins-Minerals (THERAPEUTIC MULTIVITAMIN-MINERALS) tablet, Take 1 tablet by mouth daily morning  Cholecalciferol (VITAMIN D) 50 MCG (2000 UT) CAPS capsule, Take 2,000 Units by mouth daily morning  levETIRAcetam (KEPPRA) 1000 MG tablet, TAKE ONE AND

## 2023-12-30 NOTE — PROGRESS NOTES
4 Eyes Skin Assessment     NAME:  Vipul Sarabia  YOB: 1940  MEDICAL RECORD NUMBER:  [id number]    The patient is being assessed for  Admission    I agree that at least one RN has performed a thorough Head to Toe Skin Assessment on the patient. ALL assessment sites listed below have been assessed.      Areas assessed by both nurses:    Head, Face, Ears, Shoulders, Back, Chest, Arms, Elbows, Hands, Sacrum. Buttock, Coccyx, Ischium, Legs. Feet and Heels, and Under Medical Devices         Does the Patient have a Wound? No noted wound(s)     Scattered redness and abrasions to BUE's      Calluses to bilateral feet    Ivan Prevention initiated by RN: No  Wound Care Orders initiated by RN: No    Pressure Injury (Stage 3,4, Unstageable, DTI, NWPT, and Complex wounds) if present, place Wound referral order by RN under : No    New Ostomies, if present place, Ostomy referral order under : No     Nurse 1 eSignature: Electronically signed by Ruby Swenson RN on 12/30/23 at 12:23 PM EST    **SHARE this note so that the co-signing nurse can place an eSignature**    Nurse 2 eSignature: Electronically signed by Sharifa Simon RN on 12/30/23 at 4:46 PM EST

## 2023-12-30 NOTE — ED NOTES
Mr. Jade Escoto is a 78 y.o. male who had concerns including Seizures (Pt to er with seizures, one at home and 2 in route, in route lasting 2 minurtes a piece. history of same several years ago, given 5 versed in route). Chief Complaint   Patient presents with    Seizures     Pt to er with seizures, one at home and 2 in route, in route lasting 2 minurtes a piece. history of same several years ago, given 5 versed in route       He is being admitted for:    <principal problem not specified>    His ED problem list included:    1.  Seizures ([address])        Past Medical History:   Diagnosis Date    Ankle fracture, right     Arthritis     Diastolic murmur     Gout     Heart valve problem     2011-long term ,no problem with    Hyperlipidemia     Hypertension     Pacemaker        Past Surgical History:   Procedure Laterality Date    ANKLE SURGERY      RT    APPENDECTOMY      CARDIAC VALVE REPLACEMENT  07/18/2022    CHOLECYSTECTOMY      EYE SURGERY      bilateral cataracts    HERNIA REPAIR      rihr  x 2    KNEE ARTHROSCOPY Right 11/19/2013    KNEE SURGERY      QUADRACEPS TENDON REPAIR Left 07/17/2015    SHOULDER ARTHROSCOPY  09/20/2011    video arthroscopy shoulder debridement acromioplasty       His recent abnormal labs were:    Labs Reviewed   CBC WITH AUTO DIFFERENTIAL - Abnormal; Notable for the following components:       Result Value    RBC 3.76 (*)     Hemoglobin 12.6 (*)     Hematocrit 38.9 (*)     .5 (*)     Platelets 91 (*)     All other components within normal limits   COMPREHENSIVE METABOLIC PANEL W/ REFLEX TO MG FOR LOW K - Abnormal; Notable for the following components:    Potassium reflex Magnesium 3.4 (*)     CO2 15 (*)     Anion Gap 27 (*)     Glucose 135 (*)     Total Protein 8.6 (*)     AST 39 (*)     All other components within normal limits   TROPONIN - Abnormal; Notable for the following components:    Troponin, High Sensitivity 62 (*)     All other components within normal limits   LACTIC ACID - TECHNOLOGIST PROVIDED HISTORY: Reason for exam:->stroke symptoms Reason for Exam: stroke symptoms FINDINGS: Left-sided bipolar cardiac pacer is again noted.  The patient has undergone interval TAVR.  The lungs are without acute focal process.  There is no effusion or pneumothorax. The cardiomediastinal silhouette is stable. The osseous structures are stable.     No acute process.

## 2023-12-30 NOTE — PROGRESS NOTES
Patient admitted to room 5505. Report received from RN via telephone. VSS on 2L O2. Patient oriented to room and call light. Rights and responsibilities provided to patient, and welcome packet provided to patient. 4 eyes skin assessment completed with 2nd RN.    Pt lethargic and unable to participate in assessment or follow commands at this time.  Son and granddaughter at bedside, seizure precautions in place.  Notified Neuro-critical on call of pt's arrival.

## 2023-12-30 NOTE — PROGRESS NOTES
Pt A&O x1, intermittently oriented/ disoriented.  Pt noted to be more easily arousable and more able to participate in discussion/ assessments than upon arrival.  No acute events noted this shift.    Pt voiding adequately via sultana catheter and tolerating PO diet.      All fall and safety precautions in place, bed locked and in lowest position, call light and belongings within reach.  Pt denies further needs at this time.  Family at bedside.  Seizure precautions remain in place.

## 2023-12-30 NOTE — ED NOTES
5896 - pt not code stroke at this time, will call stroke team if CT doesn't show bleed  0814 - called  stroke team   0815 - called lab  8211 - Dr Natacha Puga called back to speak with Dr Jayshree Brennan. Dr Jayshree Brennan with the pt at this time d/t seizure in CT.  Obtained Dr Marii Delgado direct number to call back to when Dr Jayshree Brennan ready

## 2023-12-30 NOTE — ED PROVIDER NOTES
Marital status:      Spouse name: Tiffany   • Number of children: 2   • Years of education: high school   • Highest education level: Not on file   Occupational History   • Not on file   Tobacco Use   • Smoking status: Former     Current packs/day: 0.00     Types: Cigarettes     Quit date: 2003     Years since quittin.1   • Smokeless tobacco: Former   • Tobacco comments:     cigar smoker   Substance and Sexual Activity   • Alcohol use: Yes     Alcohol/week: 2.0 - 3.0 standard drinks of alcohol     Types: 2 - 3 Cans of beer per week     Comment: 3 week    • Drug use: No   • Sexual activity: Not Currently   Other Topics Concern   • Not on file   Social History Narrative   • Not on file     Social Determinants of Health     Financial Resource Strain: Not on file   Food Insecurity: Not on file   Transportation Needs: Not on file   Physical Activity: Not on file   Stress: Not on file   Social Connections: Not on file   Intimate Partner Violence: Not on file   Housing Stability: Not on file         Differential diagnosis includes but is not limited to: status epilepticus, breakthrough seizure, intracranial bleed, brain tumor, alcohol withdrawal, hypoglycemia, tongue laceration    WORKUP INTERPRETATION:  ED Course as of 23 1154   Sat Dec 30, 2023   0816 On my interpretation, I do not see any large bleed on patient's CT.  Given history of no initial seizure-like activity noted and the left-sided weakness, will call a code stroke [ER]   0825 Spoke to Dr. Daniels of [facility] stroke team.  We discussed the presentation.  She did agree with plan for imaging, but at this time did not recommend treatment as this sounded more like seizure than stroke. [ER]   0826 Patient is not hypoglycemic [ER]   0834 Anemia to 12.6, thrombocytopenia to 91.  No leukocytosis [ER]   0835 CT Head: IMPRESSION:  No acute intracranial process identified. [ER]   0840 Lactate significantly elevated to 11.6.  Patient has  12/30/23 1007)   sodium bicarbonate 8.4 % injection 50 mEq (50 mEq IntraVENous Given 12/30/23 0845)        REASSESSMENT:  On reassessment, patient has not had any further seizures in the emergency department after receiving Ativan and Keppra. However, he has not returned to his baseline mental status. Patient is protecting his airway. Symptoms consistent with seizure. Did speak to the [facility] stroke team, they had low suspicion for stroke. Patient will be transferred to [facility] for further workup and management. Patient accepted by the [facility] neurology team.  Patient transferred in stable condition    Vitals:    Vitals:    12/30/23 1040 12/30/23 1050 12/30/23 1102 12/30/23 1112   BP: 116/80 121/84 126/80 115/77   Pulse: 82 90 88 85   Resp: 16 13 12 14   Temp:       TempSrc:       SpO2: 99% 99% 100% 99%   Weight:       Height:           CRITICAL CARE TIME     I personally spent a total of 35 minutes of critical care time in obtaining history, performing a physical exam, bedside monitoring of interventions, collecting and interpreting tests and discussion with consultants but excluding time spent performing procedures, treating other patients and teaching time. FINAL IMPRESSION      1. Seizures ([address])    2. Altered mental status, unspecified altered mental status type          DISPOSITION/PLAN   Disposition: DISPOSITION Decision To Transfer 12/30/2023 09:42:52 AM    Condition: stable     DISCLAIMER: This chart was created using Dragon dictation software. Efforts were made by me to ensure accuracy, however some errors may be present due to limitations of this technology and occasionally words are not transcribed correctly.     MD Hosea Browne MD  12/30/23 9401

## 2023-12-30 NOTE — H&P
V2.0  History and Physical      Name:  Vipul Sarabia /Age/Sex: 1940  (83 y.o. male)   MRN & CSN:  [id number] & [id number] Encounter Date/Time: 2023 2:10 PM EST   Location:  [facility]5505- PCP: Joel Aj MD       Hospital Day: 1    Assessment and Plan:   Vipul Sarabia is a 83 y.o. male with a pmh of Alzheimer's, aortic stenosis s/p AVR in , seizure disorder who presents with Seizures (HCC)    #Epilepsy with recurrent seizures  - Admit to hospital  - Neuro consulted  - AEDs and EEG per their recs  - Seizure precautions  - Trend BMP    #Lactic Acidosis  #Metabolic Acidosis 2/2 above  - suspect 2/2 seizure  - IVF today  - Recheck BMP tomorrow    #Aortic Stenosis s/p AVR  #SSS s/p PPM  - Tele    #HLD - Continue statin  #BPH - Continue tamsulosin    Disposition:   Current Living situation: Home  Expected Disposition: Home  Estimated D/C: 2+ days    Diet ADULT DIET; Regular   DVT Prophylaxis [] Lovenox, []  Heparin, [] SCDs, [] Ambulation,  [] Eliquis, [] Xarelto, [] Coumadin   Code Status Full Code   Surrogate Decision Maker/ POA Jakob Sarabia, child     Personally reviewed Lab Studies and Imaging     EKG interpreted personally and results paced rhythm    Imaging that was interpreted personally includes CXR and results no focal consolidation or sign of pulmonary edema        History from:     patient, family member - granddaughter    History of Present Illness:     Chief Complaint: Seizure    Vipul Sarabia is a 83 y.o. male with pmh of epilepsy (on AEDs since  but weaned off 2023), AS s/p AVR, SSS s/p PPM who presents with concern for seizure. His initial seizure semiology was primarily staring spells and granddaughter reports he's been seizure free since 2017. In the last month had 2 brief staring spells concerning for possible seizure but not worked up further at the time. Earlier today granddaughter noticed checking on him on a home monitor that he was getting dressed when  in the Last Year: Patient unable to answer   Transportation Needs: Patient Unable To Answer (12/30/2023)    PRAPARE - Transportation     Lack of Transportation (Medical): Patient unable to answer     Lack of Transportation (Non-Medical): Patient unable to answer   Housing Stability: Patient Unable To Answer (12/30/2023)    Housing Stability Vital Sign     Unable to Pay for Housing in the Last Year: Patient unable to answer     Number of Places Lived in the Last Year: 1     Unstable Housing in the Last Year: Patient unable to answer       Medications:   Medications:    sodium chloride flush  5-40 mL IntraVENous 2 times per day    enoxaparin  40 mg SubCUTAneous Daily      Infusions:    sodium chloride      lactated ringers IV soln       PRN Meds: sodium chloride flush, 5-40 mL, PRN  sodium chloride, , PRN  potassium chloride, 40 mEq, PRN   Or  potassium alternative oral replacement, 40 mEq, PRN   Or  potassium chloride, 10 mEq, PRN  magnesium sulfate, 2,000 mg, PRN  ondansetron, 4 mg, Q8H PRN   Or  ondansetron, 4 mg, Q6H PRN  polyethylene glycol, 17 g, Daily PRN  acetaminophen, 650 mg, Q6H PRN   Or  acetaminophen, 650 mg, Q6H PRN        Labs      CBC:   Recent Labs     12/30/23  0809   WBC 6.4   HGB 12.6*   PLT 91*     BMP:    Recent Labs     12/30/23  0809      K 3.4*      CO2 15*   BUN 16   CREATININE 1.0   GLUCOSE 135*     Hepatic:   Recent Labs     12/30/23  0809   AST 39*   ALT 23   BILITOT 0.5   ALKPHOS 105     Lipids: No results found for: \"CHOL\", \"HDL\", \"TRIG\"  Hemoglobin A1C: No results found for: \"LABA1C\"  TSH: No results found for: \"TSH\"  Troponin: No results found for: \"TROPONINT\"  Lactic Acid:   Recent Labs     12/30/23  0809   LACTA 11.6*     BNP: No results for input(s): \"PROBNP\" in the last 72 hours.  UA:  Lab Results   Component Value Date/Time    NITRU Negative 12/30/2023 08:37 AM    COLORU Yellow 12/30/2023 08:37 AM    PHUR 6.0 12/30/2023 08:37 AM    PHUR 6.0 12/30/2023 08:37 AM

## 2023-12-31 LAB
ANION GAP SERPL CALCULATED.3IONS-SCNC: 9 MMOL/L (ref 3–16)
BASOPHILS # BLD: 0 K/UL (ref 0–0.2)
BASOPHILS NFR BLD: 0.4 %
BUN SERPL-MCNC: 12 MG/DL (ref 7–20)
CALCIUM SERPL-MCNC: 9.3 MG/DL (ref 8.3–10.6)
CHLORIDE SERPL-SCNC: 106 MMOL/L (ref 99–110)
CO2 SERPL-SCNC: 27 MMOL/L (ref 21–32)
CREAT SERPL-MCNC: 0.9 MG/DL (ref 0.8–1.3)
DEPRECATED RDW RBC AUTO: 14 % (ref 12.4–15.4)
EOSINOPHIL # BLD: 0.1 K/UL (ref 0–0.6)
EOSINOPHIL NFR BLD: 2 %
GFR SERPLBLD CREATININE-BSD FMLA CKD-EPI: >60 ML/MIN/{1.73_M2}
GLUCOSE SERPL-MCNC: 86 MG/DL (ref 70–99)
HCT VFR BLD AUTO: 30.5 % (ref 40.5–52.5)
HGB BLD-MCNC: 10.3 G/DL (ref 13.5–17.5)
LYMPHOCYTES # BLD: 0.8 K/UL (ref 1–5.1)
LYMPHOCYTES NFR BLD: 15.8 %
MCH RBC QN AUTO: 33.4 PG (ref 26–34)
MCHC RBC AUTO-ENTMCNC: 33.6 G/DL (ref 31–36)
MCV RBC AUTO: 99.2 FL (ref 80–100)
MONOCYTES # BLD: 0.5 K/UL (ref 0–1.3)
MONOCYTES NFR BLD: 10.6 %
NEUTROPHILS # BLD: 3.5 K/UL (ref 1.7–7.7)
NEUTROPHILS NFR BLD: 71.2 %
PLATELET # BLD AUTO: 60 K/UL (ref 135–450)
PMV BLD AUTO: 8.9 FL (ref 5–10.5)
POTASSIUM SERPL-SCNC: 3.6 MMOL/L (ref 3.5–5.1)
RBC # BLD AUTO: 3.08 M/UL (ref 4.2–5.9)
SODIUM SERPL-SCNC: 142 MMOL/L (ref 136–145)
WBC # BLD AUTO: 4.8 K/UL (ref 4–11)

## 2023-12-31 PROCEDURE — 80048 BASIC METABOLIC PNL TOTAL CA: CPT

## 2023-12-31 PROCEDURE — 6370000000 HC RX 637 (ALT 250 FOR IP): Performed by: STUDENT IN AN ORGANIZED HEALTH CARE EDUCATION/TRAINING PROGRAM

## 2023-12-31 PROCEDURE — 6360000002 HC RX W HCPCS: Performed by: STUDENT IN AN ORGANIZED HEALTH CARE EDUCATION/TRAINING PROGRAM

## 2023-12-31 PROCEDURE — 85025 COMPLETE CBC W/AUTO DIFF WBC: CPT

## 2023-12-31 PROCEDURE — 6360000002 HC RX W HCPCS: Performed by: PSYCHIATRY & NEUROLOGY

## 2023-12-31 PROCEDURE — 2060000000 HC ICU INTERMEDIATE R&B

## 2023-12-31 PROCEDURE — 99231 SBSQ HOSP IP/OBS SF/LOW 25: CPT | Performed by: NURSE PRACTITIONER

## 2023-12-31 PROCEDURE — 36415 COLL VENOUS BLD VENIPUNCTURE: CPT

## 2023-12-31 PROCEDURE — 2580000003 HC RX 258: Performed by: STUDENT IN AN ORGANIZED HEALTH CARE EDUCATION/TRAINING PROGRAM

## 2023-12-31 RX ADMIN — SODIUM CHLORIDE, PRESERVATIVE FREE 10 ML: 5 INJECTION INTRAVENOUS at 20:00

## 2023-12-31 RX ADMIN — LEVETIRACETAM 1500 MG: 500 INJECTION, SOLUTION, CONCENTRATE INTRAVENOUS at 08:13

## 2023-12-31 RX ADMIN — SODIUM CHLORIDE, POTASSIUM CHLORIDE, SODIUM LACTATE AND CALCIUM CHLORIDE: 600; 310; 30; 20 INJECTION, SOLUTION INTRAVENOUS at 10:46

## 2023-12-31 RX ADMIN — SODIUM CHLORIDE, PRESERVATIVE FREE 10 ML: 5 INJECTION INTRAVENOUS at 08:13

## 2023-12-31 RX ADMIN — ACETAMINOPHEN 650 MG: 325 TABLET ORAL at 06:04

## 2023-12-31 RX ADMIN — ENOXAPARIN SODIUM 40 MG: 100 INJECTION SUBCUTANEOUS at 08:13

## 2023-12-31 RX ADMIN — LEVETIRACETAM 1500 MG: 500 INJECTION, SOLUTION, CONCENTRATE INTRAVENOUS at 20:00

## 2023-12-31 NOTE — PROGRESS NOTES
NEUROLOGY PROGRESS NOTE       Patient Name: Vipul Peguero YOB: 1940   Sex: Male Age: 83 yrs     CC / Reason for Consult: seizure    Changes over last 24 hours:   Family at bedside, reports he is not back to baseline  Lives alone, does not drive  Has history of dementia but this isn't well described    ROS: +questionable motor aphasia; no focal weakness    ASSESSMENT & RECOMMENDATIONS   Assessment:  Vipul peguero is an 82 y/o man with a history of seizures (abruptly discontinued Keppra March 2023) who presents with breakthrough seizures and most likely post-ictal encephalopathy (though prudent to exclude NCSE in this context).     Plan:  - cEEG (ordered)  - Continue Keppra 1500/1500  - He does not drive  - I would have PT/OT evaluate for safety/returning home (consult placed)    STEVEN Wilson - CNP   Neurology  12/31/2023 1:30 PM  [employer]: [facility] Neurology    HISTORY   Interval History: exam improved but per family, he is not back to baseline    PMH Past Medical History:   Diagnosis Date    Ankle fracture, right     Arthritis     Diastolic murmur     Gout     Heart valve problem     2011-long term ,no problem with    Hyperlipidemia     Hypertension     Pacemaker       Allergies No Known Allergies   Diet ADULT DIET; Regular   Isolation No active isolations     CURRENT SCHEDULED MEDICATIONS   Inpatient Medications     sodium chloride flush, 5-40 mL, IntraVENous, 2 times per day    enoxaparin, 40 mg, SubCUTAneous, Daily    levETIRAcetam, 1,500 mg, IntraVENous, Q12H   Infusions    sodium chloride      lactated ringers IV soln 100 mL/hr at 12/31/23 1046              LABS   Metabolic Panel Recent Labs     12/30/23  0809 12/31/23  0529    142   K 3.4* 3.6    106   CO2 15* 27   BUN 16 12   CREATININE 1.0 0.9   GLUCOSE 135* 86   CALCIUM 10.1 9.3   LABALBU 4.5  --    MG 2.00  --    ALKPHOS 105  --    ALT 23  --    AST 39*  --       CBC / Coags Recent Labs     12/30/23  0809

## 2023-12-31 NOTE — PROGRESS NOTES
Family refusing cEEG  Will order routine. Counseled nursing this will not be as sensitive.    Hannah Scott NP  Neurology

## 2023-12-31 NOTE — PLAN OF CARE
Problem: Safety - Adult  Goal: Free from fall injury  12/31/2023 0752 by Ruby Swenson, RN  Outcome: Progressing  All fall precautions in place. Bed locked and in lowest position with alarm on. Overbed table and personal belonings within reach. Call light within reach and patient instructed to use call light for assistance. Non-skid socks on.      Problem: ABCDS Injury Assessment  Goal: Absence of physical injury  12/31/2023 0752 by Ruby Swenson, RN  Outcome: Progressing  Pt free of injuries this shift.  Seizure and safety precautions maintained.

## 2023-12-31 NOTE — PLAN OF CARE
Problem: Discharge Planning  Goal: Discharge to home or other facility with appropriate resources  Outcome: Progressing     Problem: Pain  Goal: Verbalizes/displays adequate comfort level or baseline comfort level  Outcome: Progressing     Problem: Skin/Tissue Integrity  Goal: Absence of new skin breakdown  Description: 1.  Monitor for areas of redness and/or skin breakdown  2.  Assess vascular access sites hourly  3.  Every 4-6 hours minimum:  Change oxygen saturation probe site  4.  Every 4-6 hours:  If on nasal continuous positive airway pressure, respiratory therapy assess nares and determine need for appliance change or resting period.  Outcome: Progressing     Problem: Safety - Adult  Goal: Free from fall injury  12/31/2023 0345 by Lesli Castrejon, RN  Outcome: Progressing    Problem: ABCDS Injury Assessment  Goal: Absence of physical injury  12/31/2023 0345 by Lesli Castrejon, RN  Outcome: Progressing

## 2023-12-31 NOTE — PROGRESS NOTES
Pt is alert and oriented to self only.stable vitals on RA. Tylenol given for pain. No acute neuro changes. No seizure activity noted. [place] care provided with soap ad water.  Frequent position change. Pt's granddaughter is on bedside.All fall precaution are in place. call light within a reach. bed is in lowest position. Bed Alarm is on for safety. Will continue monitor .....

## 2023-12-31 NOTE — PROGRESS NOTES
Pt A&O x1-2, intermittently oriented/ disoriented.  No acute events noted this shift.  Neuro checks remain unchanged.      Pt is tolerating PO diet and fluids well.      All fall and safety precautions in place, bed locked and in lowest position, call light and belongings within reach.  Family at bedside.  Seizure precautions maintained.

## 2023-12-31 NOTE — PROGRESS NOTES
0315 12/31/23 0645 12/31/23 1042   BP: 138/82 (!) 143/76 (!) 149/83 135/74   Pulse: 60 63 62 60   Resp: 16 18 16 16   Temp: 97.9 °F (36.6 °C) 97.8 °F (36.6 °C) 97.6 °F (36.4 °C) 97.8 °F (36.6 °C)   TempSrc: Temporal Axillary Axillary Temporal   SpO2: 98% 95% 93% 94%   Weight:       Height:             Physical Exam:      General: Laying in bed, more awake today, NAD  Eyes: EOMI  ENT: neck supple  Cardiovascular: Regular rate.  Respiratory: Clear to auscultation  Gastrointestinal: Soft, non tender  Genitourinary: no suprapubic tenderness  Musculoskeletal: No edema  Skin: warm, dry  Neuro: Follows commands in all extremities        Medications:   Medications:    sodium chloride flush  5-40 mL IntraVENous 2 times per day    enoxaparin  40 mg SubCUTAneous Daily    levETIRAcetam  1,500 mg IntraVENous Q12H      Infusions:    sodium chloride      lactated ringers IV soln 100 mL/hr at 12/31/23 1046     PRN Meds: sodium chloride flush, 5-40 mL, PRN  sodium chloride, , PRN  potassium chloride, 40 mEq, PRN   Or  potassium alternative oral replacement, 40 mEq, PRN   Or  potassium chloride, 10 mEq, PRN  magnesium sulfate, 2,000 mg, PRN  polyethylene glycol, 17 g, Daily PRN  acetaminophen, 650 mg, Q6H PRN   Or  acetaminophen, 650 mg, Q6H PRN  prochlorperazine, 5 mg, Q6H PRN  prochlorperazine, 10 mg, Q6H PRN        Labs and Imaging   CTA HEAD NECK W CONTRAST    Result Date: 12/30/2023  EXAMINATION: CTA OF THE HEAD AND NECK WITH CONTRAST 12/30/2023 8:24 am: TECHNIQUE: CTA of the head and neck was performed with the administration of intravenous contrast. Multiplanar reformatted images are provided for review.  MIP images are provided for review. Stenosis of the internal carotid arteries measured using NASCET criteria. Automated exposure control, iterative reconstruction, and/or weight based adjustment of the mA/kV was utilized to reduce the radiation dose to as low as reasonably achievable. This scan was analyzed using [employer]  dural venous sinus thrombosis on this non-dedicated study. BRAIN: No evidence of mass effect or midline shift.     1. Atherosclerosis contributes to severe stenosis at the origin of both vertebral arteries. 2. Atherosclerosis also contributes to 50% stenosis of the origin of the left cervical ICA with less than 50% stenosis of the right cervical ICA. 3. No significant stenosis or large vessel occlusion of the [id number].     CT HEAD WO CONTRAST    Result Date: 12/30/2023  EXAMINATION: CT OF THE HEAD WITHOUT CONTRAST  12/30/2023 8:15 am TECHNIQUE: CT of the head was performed without the administration of intravenous contrast. Automated exposure control, iterative reconstruction, and/or weight based adjustment of the mA/kV was utilized to reduce the radiation dose to as low as reasonably achievable. COMPARISON: None. HISTORY: ORDERING SYSTEM PROVIDED HISTORY: Stroke Symptoms TECHNOLOGIST PROVIDED HISTORY: Has a \"code stroke\" or \"stroke alert\" been called?->Yes Reason for exam:->Stroke Symptoms Decision Support Exception - unselect if not a suspected or confirmed emergency medical condition->Emergency Medical Condition (MA) Reason for Exam: seizure,possible stroke FINDINGS: BRAIN/VENTRICLES: There is no acute infarct or acute intracranial hemorrhage present.  There is no mass effect or midline shift present. Mild diffuse cerebral volume loss is present.  There is periventricular hypoattenuation. A remote lacunar infarct is present within the left basal ganglia. ORBITS: Limited evaluation of the orbits is unremarkable. SINUSES: The paranasal sinuses and mastoid air cells are clear. SOFT TISSUES/SKULL:  No lytic or blastic osseous lesions are identified.     No acute intracranial process identified. The findings were sent to the Radiology Results Communication Center at 8:27 am on 12/30/2023 to be communicated to a licensed caregiver. The findings were subsequently relayed to Dr. Rios by the CORE team at 8:34 a.m.

## 2023-12-31 NOTE — PROGRESS NOTES
This RN notified Dr. Romano of urine in sultana bag having red tinge.  Inquired about plan with sultana.  Awaiting response.

## 2024-01-01 PROCEDURE — 2580000003 HC RX 258: Performed by: STUDENT IN AN ORGANIZED HEALTH CARE EDUCATION/TRAINING PROGRAM

## 2024-01-01 PROCEDURE — 6360000002 HC RX W HCPCS: Performed by: PSYCHIATRY & NEUROLOGY

## 2024-01-01 PROCEDURE — 2060000000 HC ICU INTERMEDIATE R&B

## 2024-01-01 PROCEDURE — 99231 SBSQ HOSP IP/OBS SF/LOW 25: CPT | Performed by: NURSE PRACTITIONER

## 2024-01-01 PROCEDURE — 95819 EEG AWAKE AND ASLEEP: CPT

## 2024-01-01 PROCEDURE — 6370000000 HC RX 637 (ALT 250 FOR IP): Performed by: NURSE PRACTITIONER

## 2024-01-01 PROCEDURE — 6360000002 HC RX W HCPCS: Performed by: STUDENT IN AN ORGANIZED HEALTH CARE EDUCATION/TRAINING PROGRAM

## 2024-01-01 RX ORDER — MECOBALAMIN 5000 MCG
5 TABLET,DISINTEGRATING ORAL NIGHTLY PRN
Status: DISCONTINUED | OUTPATIENT
Start: 2024-01-01 | End: 2024-01-04 | Stop reason: HOSPADM

## 2024-01-01 RX ADMIN — LEVETIRACETAM 1500 MG: 500 INJECTION, SOLUTION, CONCENTRATE INTRAVENOUS at 09:18

## 2024-01-01 RX ADMIN — SODIUM CHLORIDE, PRESERVATIVE FREE 10 ML: 5 INJECTION INTRAVENOUS at 09:19

## 2024-01-01 RX ADMIN — ENOXAPARIN SODIUM 40 MG: 100 INJECTION SUBCUTANEOUS at 09:18

## 2024-01-01 RX ADMIN — Medication 5 MG: at 20:40

## 2024-01-01 RX ADMIN — LEVETIRACETAM 1500 MG: 500 INJECTION, SOLUTION, CONCENTRATE INTRAVENOUS at 20:41

## 2024-01-01 NOTE — PROGRESS NOTES
Pt is Alert but oriented to self only. Pt has hx of dementia.stable vitals on RA. No acute neuro changes. No seizure activity noted.  Voiding adequately. Incontinence care provided.All fall precaution are in place. call light within a reach. bed is in lowest position. Bed Alarm is on for safety. Will continue monitor .....

## 2024-01-01 NOTE — PLAN OF CARE
Problem: Discharge Planning  Goal: Discharge to home or other facility with appropriate resources  1/1/2024 1011 by Dinorah Molina RN  Outcome: Progressing  1/1/2024 0044 by Lesli Castrejon RN  Outcome: Progressing     Problem: Pain  Goal: Verbalizes/displays adequate comfort level or baseline comfort level  1/1/2024 1011 by Dinorah Molina RN  Outcome: Progressing  1/1/2024 0044 by Lesli Castrejon RN  Outcome: Progressing     Problem: Skin/Tissue Integrity  Goal: Absence of new skin breakdown  Description: 1.  Monitor for areas of redness and/or skin breakdown  2.  Assess vascular access sites hourly  3.  Every 4-6 hours minimum:  Change oxygen saturation probe site  4.  Every 4-6 hours:  If on nasal continuous positive airway pressure, respiratory therapy assess nares and determine need for appliance change or resting period.  1/1/2024 1011 by Dinorah Molina RN  Outcome: Progressing  1/1/2024 0044 by Lesli Castrejon RN  Outcome: Progressing     Problem: Safety - Adult  Goal: Free from fall injury  1/1/2024 1011 by Dinorah Molina RN  Outcome: Progressing  1/1/2024 0044 by Lesli Castrejon RN  Outcome: Progressing     Problem: ABCDS Injury Assessment  Goal: Absence of physical injury  1/1/2024 1011 by Dinorah Molina RN  Outcome: Progressing  1/1/2024 0044 by Lesli Castrejon RN  Outcome: Progressing

## 2024-01-01 NOTE — PROGRESS NOTES
Pt returned from his EEG testing. Pt's sheets were wet with urine. [place] care done, all linens changed. New condom cath placed. Bed alarm on and call light in reach.  Dniorah Molina RN

## 2024-01-01 NOTE — PROGRESS NOTES
NEUROLOGY PROGRESS NOTE       Patient Name: Vipul Peguero YOB: 1940   Sex: Male Age: 83 yrs     CC / Reason for Consult: seizure    Changes over last 24 hours:   Family refused cEEG  Family refused alternative to Keppra, though acknowledge this may be contributing to somnolence  rEEG pending but unlikely to  as he is on AEDs known to be effective for him in the past  Afebrile     ROS: No aphasia; no focal weakness    ASSESSMENT & RECOMMENDATIONS   Assessment:  Vipul peguero is an 82 y/o man with a history of seizures (abruptly discontinued Keppra March 2023) who presents with breakthrough seizures and most likely post-ictal encephalopathy (though prudent to exclude NCSE in this context).     Plan:  - Continue Keppra 1500/1500  - He does not drive  - PT/OT   - Awaiting routine EEG. Unlikely to .    Will follow peripherally.     STEVEN Wilson - [facility]   Neurology  1/1/2024 9:48 AM  [facility]: [facility] Neurology    HISTORY   Interval History: He is still not back to neurologic baseline per granddaughter at bedside    [facility] Past Medical History:   Diagnosis Date    Ankle fracture, right     Arthritis     Diastolic murmur     Gout     Heart valve problem     2011-long term ,no problem with    Hyperlipidemia     Hypertension     Pacemaker       Allergies No Known Allergies   Diet ADULT DIET; Regular   Isolation No active isolations     CURRENT SCHEDULED MEDICATIONS   Inpatient Medications     sodium chloride flush, 5-40 mL, IntraVENous, 2 times per day    enoxaparin, 40 mg, SubCUTAneous, Daily    levETIRAcetam, 1,500 mg, IntraVENous, Q12H   Infusions    sodium chloride      lactated ringers IV soln 100 mL/hr at 12/31/23 1046              LABS   Metabolic Panel Recent Labs     12/30/23  0809 12/31/23  0529    142   K 3.4* 3.6    106   CO2 15* 27   BUN 16 12   CREATININE 1.0 0.9   GLUCOSE 135* 86   CALCIUM 10.1 9.3   LABALBU 4.5  --    MG 2.00  --

## 2024-01-01 NOTE — PLAN OF CARE
Problem: Discharge Planning  Goal: Discharge to home or other facility with appropriate resources  Outcome: Progressing     Problem: Pain  Goal: Verbalizes/displays adequate comfort level or baseline comfort level  Outcome: Progressing     Problem: Skin/Tissue Integrity  Goal: Absence of new skin breakdown  Description: 1.  Monitor for areas of redness and/or skin breakdown  2.  Assess vascular access sites hourly  3.  Every 4-6 hours minimum:  Change oxygen saturation probe site  4.  Every 4-6 hours:  If on nasal continuous positive airway pressure, respiratory therapy assess nares and determine need for appliance change or resting period.  Outcome: Progressing     Problem: Safety - Adult  Goal: Free from fall injury  Outcome: Progressing     Problem: ABCDS Injury Assessment  Goal: Absence of physical injury  Outcome: Progressing

## 2024-01-01 NOTE — PROCEDURES
Assessment complete, see flow sheet. Granddaughter at bed side. Pt is able to answer my questions and follow commands, pt is drowsy but will answer questions/follow commands. Pt is alert and oriented to person, place and situation but not time. Per granddaughter that is is baseline. Per granddaughter he hasn't know the date/year for many years. Call light in reach, bed alarm on, seizure precautions in place. I will continue to follow. Dinorah Molina RN

## 2024-01-01 NOTE — PROGRESS NOTES
V2.0    [facility] [place]      Name:  Vipul Sarabia /Age/Sex: 1940  (83 y.o. male)   MRN & CSN:  [id number] & [id number] Encounter Date/Time: 2024 2:13 PM EST   Location:  [facility]- PCP: Joel Aj MD     Attending:Emeterio Romano MD       Hospital Day: 3    Assessment and Recommendations   Vipul Sarabia is a 83 y.o. male with pmh of Alzheimer's, aortic stenosis s/p AVR in , seizure disorder  who presents with Complex partial seizures with consciousness impaired (HCC)      #Epilepsy with recurrent seizures  #Hospital acquired delirium  - Neuro consulted  - Seizure precautions  - Restarted on Keppra 1500 mg bid, continue on discharge  - Obtaining EEG to exclude non-convulsive status epilepticus  - PT/OT  - Delirium precautions    #Lactic Acidosis, improved  #Metabolic Acidosis 2/2 above, improved  - Trend BMP     #Aortic Stenosis s/p AVR  #SSS s/p PPM  - Tele     #HLD - Continue statin  #BPH - Continue tamsulosin    Diet ADULT DIET; Regular   DVT Prophylaxis [] Lovenox, []  Heparin, [] SCDs, [] Ambulation,  [] Eliquis, [] Xarelto  [] Coumadin   Code Status Full Code   Disposition From: Home  Expected Disposition: Home, family open to [facility] but do not want SNF even if recommended by PT/OT  Estimated Date of Discharge: 1 day  Patient requires continued admission due to EEG and PT/OT; once complete should be medically ready for discharge   Surrogate Decision Maker/ POA  Jakob Sarabia, child     Personally reviewed Lab Studies and Imaging         Subjective:     Chief Complaint: Seizure    Vipul Sarabia is a 83 y.o. male with pmh of epilepsy (on AEDs since  but weaned off 2023), AS s/p AVR, SSS s/p PPM who presented with concern for repeat seizure.     No acute clinical events reported overnight. Vitals stable. Patient remains alert and responsive. Family at bedside. Seems more confused overall today; family reports he often develops delirium in the hospital and improves

## 2024-01-02 ENCOUNTER — APPOINTMENT (OUTPATIENT)
Dept: GENERAL RADIOLOGY | Age: 84
DRG: 101 | End: 2024-01-02
Attending: PSYCHIATRY & NEUROLOGY
Payer: MEDICARE

## 2024-01-02 PROBLEM — G40.209 COMPLEX PARTIAL SEIZURE DISORDER (HCC): Status: ACTIVE | Noted: 2024-01-02

## 2024-01-02 PROCEDURE — 6360000002 HC RX W HCPCS: Performed by: STUDENT IN AN ORGANIZED HEALTH CARE EDUCATION/TRAINING PROGRAM

## 2024-01-02 PROCEDURE — 6370000000 HC RX 637 (ALT 250 FOR IP): Performed by: INTERNAL MEDICINE

## 2024-01-02 PROCEDURE — 92610 EVALUATE SWALLOWING FUNCTION: CPT

## 2024-01-02 PROCEDURE — 6370000000 HC RX 637 (ALT 250 FOR IP): Performed by: NURSE PRACTITIONER

## 2024-01-02 PROCEDURE — 97535 SELF CARE MNGMENT TRAINING: CPT

## 2024-01-02 PROCEDURE — 95700 EEG CONT REC W/VID EEG TECH: CPT

## 2024-01-02 PROCEDURE — 97530 THERAPEUTIC ACTIVITIES: CPT

## 2024-01-02 PROCEDURE — 97162 PT EVAL MOD COMPLEX 30 MIN: CPT

## 2024-01-02 PROCEDURE — 99232 SBSQ HOSP IP/OBS MODERATE 35: CPT

## 2024-01-02 PROCEDURE — 73110 X-RAY EXAM OF WRIST: CPT

## 2024-01-02 PROCEDURE — 95713 VEEG 2-12 HR CONT MNTR: CPT

## 2024-01-02 PROCEDURE — 2580000003 HC RX 258: Performed by: INTERNAL MEDICINE

## 2024-01-02 PROCEDURE — 95819 EEG AWAKE AND ASLEEP: CPT | Performed by: NURSE PRACTITIONER

## 2024-01-02 PROCEDURE — 97166 OT EVAL MOD COMPLEX 45 MIN: CPT

## 2024-01-02 PROCEDURE — 2580000003 HC RX 258: Performed by: STUDENT IN AN ORGANIZED HEALTH CARE EDUCATION/TRAINING PROGRAM

## 2024-01-02 PROCEDURE — 2060000000 HC ICU INTERMEDIATE R&B

## 2024-01-02 PROCEDURE — 6360000002 HC RX W HCPCS: Performed by: PSYCHIATRY & NEUROLOGY

## 2024-01-02 PROCEDURE — 6370000000 HC RX 637 (ALT 250 FOR IP): Performed by: STUDENT IN AN ORGANIZED HEALTH CARE EDUCATION/TRAINING PROGRAM

## 2024-01-02 RX ORDER — ATORVASTATIN CALCIUM 80 MG/1
80 TABLET, FILM COATED ORAL NIGHTLY
Status: DISCONTINUED | OUTPATIENT
Start: 2024-01-02 | End: 2024-01-04 | Stop reason: HOSPADM

## 2024-01-02 RX ORDER — TAMSULOSIN HYDROCHLORIDE 0.4 MG/1
0.4 CAPSULE ORAL NIGHTLY
Status: DISCONTINUED | OUTPATIENT
Start: 2024-01-02 | End: 2024-01-04 | Stop reason: HOSPADM

## 2024-01-02 RX ORDER — ROSUVASTATIN CALCIUM 20 MG/1
20 TABLET, COATED ORAL NIGHTLY
COMMUNITY
Start: 2023-11-06

## 2024-01-02 RX ORDER — FINASTERIDE 5 MG/1
5 TABLET, FILM COATED ORAL DAILY
Status: DISCONTINUED | OUTPATIENT
Start: 2024-01-02 | End: 2024-01-04 | Stop reason: HOSPADM

## 2024-01-02 RX ORDER — SODIUM CHLORIDE 9 MG/ML
INJECTION, SOLUTION INTRAVENOUS CONTINUOUS
Status: DISCONTINUED | OUTPATIENT
Start: 2024-01-02 | End: 2024-01-04

## 2024-01-02 RX ADMIN — ATORVASTATIN CALCIUM 80 MG: 80 TABLET, FILM COATED ORAL at 19:57

## 2024-01-02 RX ADMIN — TAMSULOSIN HYDROCHLORIDE 0.4 MG: 0.4 CAPSULE ORAL at 19:57

## 2024-01-02 RX ADMIN — SODIUM CHLORIDE, PRESERVATIVE FREE 10 ML: 5 INJECTION INTRAVENOUS at 08:05

## 2024-01-02 RX ADMIN — LEVETIRACETAM 1500 MG: 500 INJECTION, SOLUTION, CONCENTRATE INTRAVENOUS at 08:04

## 2024-01-02 RX ADMIN — LEVETIRACETAM 1500 MG: 500 INJECTION, SOLUTION, CONCENTRATE INTRAVENOUS at 19:57

## 2024-01-02 RX ADMIN — ACETAMINOPHEN 650 MG: 325 TABLET ORAL at 08:11

## 2024-01-02 RX ADMIN — Medication 5 MG: at 19:57

## 2024-01-02 RX ADMIN — SODIUM CHLORIDE: 9 INJECTION, SOLUTION INTRAVENOUS at 16:35

## 2024-01-02 RX ADMIN — ENOXAPARIN SODIUM 40 MG: 100 INJECTION SUBCUTANEOUS at 08:04

## 2024-01-02 RX ADMIN — FINASTERIDE 5 MG: 5 TABLET, FILM COATED ORAL at 08:04

## 2024-01-02 ASSESSMENT — PAIN SCALES - GENERAL
PAINLEVEL_OUTOF10: 1
PAINLEVEL_OUTOF10: 3

## 2024-01-02 ASSESSMENT — PAIN DESCRIPTION - FREQUENCY: FREQUENCY: INTERMITTENT

## 2024-01-02 ASSESSMENT — PAIN DESCRIPTION - LOCATION: LOCATION: BUTTOCKS;BACK

## 2024-01-02 ASSESSMENT — PAIN DESCRIPTION - ORIENTATION: ORIENTATION: POSTERIOR

## 2024-01-02 ASSESSMENT — PAIN DESCRIPTION - DESCRIPTORS: DESCRIPTORS: ACHING;SORE

## 2024-01-02 ASSESSMENT — PAIN DESCRIPTION - PAIN TYPE: TYPE: ACUTE PAIN

## 2024-01-02 ASSESSMENT — PAIN - FUNCTIONAL ASSESSMENT: PAIN_FUNCTIONAL_ASSESSMENT: PREVENTS OR INTERFERES SOME ACTIVE ACTIVITIES AND ADLS

## 2024-01-02 ASSESSMENT — PAIN DESCRIPTION - ONSET: ONSET: PROGRESSIVE

## 2024-01-02 NOTE — PLAN OF CARE
Problem: SLP Adult - Impaired Swallowing  Goal: By Discharge: Advance to least restrictive diet without signs or symptoms of aspiration for planned discharge setting.  See evaluation for individualized goals.  Outcome: Progressing

## 2024-01-02 NOTE — PROGRESS NOTES
Physical Therapy  Facility/Department: [facility] 5T ORTHO/NEURO  Physical Therapy Initial Assessment/Treatment    Name: Vipul Sarabia  : 1940  MRN: [id number]  Date of Service: 2024    Discharge Recommendations:  Subacute/Skilled Nursing Facility, IP Rehab, 24 hour supervision or assist, Home with Home health PT   PT Equipment Recommendations  Equipment Needed: No      Patient Diagnosis(es): The encounter diagnosis was Complex partial seizure disorder (HCC).  Past Medical History:  has a past medical history of Ankle fracture, right, Arthritis, Diastolic murmur, Gout, Heart valve problem, Hyperlipidemia, Hypertension, and Pacemaker.  Past Surgical History:  has a past surgical history that includes Ankle surgery; eye surgery; Cholecystectomy; hernia repair; Shoulder arthroscopy (2011); Appendectomy; Knee arthroscopy (Right, 2013); Quadraceps tendon repair (Left, 2015); knee surgery; and Cardiac valve replacement (2022).    Assessment   Body Structures, Functions, Activity Limitations Requiring Skilled Therapeutic Intervention: Decreased functional mobility   Assessment: Pt is currently functioning well below his baseline. Requires assist x 2 & use of kathy stedy for transfer to chair, Unable to ambulate. Recommend further inpt PT upon D/C. Will follow while here to maximize independene.  Treatment Diagnosis: decreased mobility  Therapy Prognosis: Good  Decision Making: Medium Complexity  Requires PT Follow-Up: Yes  Activity Tolerance  Activity Tolerance: Patient tolerated evaluation without incident     Plan   Physical Therapy Plan  General Plan:  (2-5)  Current Treatment Recommendations: Balance training, Functional mobility training, Transfer training, Endurance training, Gait training, Safety education & training, Therapeutic activities  Safety Devices  Type of Devices: Call light within reach, Chair alarm in place, Gait belt, Left in chair, Nurse notified  Restraints  Restraints  Initially in Place: No     Restrictions  Position Activity Restriction  Other position/activity restrictions: up as tolerated, seizure precautions     Subjective   Pain: pt denies pain  General  Chart Reviewed: Yes  Patient assessed for rehabilitation services?: Yes  Additional Pertinent Hx: Vipul Sarabia is a 83 y.o. male with a pmh of Alzheimer's, aortic stenosis s/p AVR in 2022, seizure disorder who presents with Seizures  Family / Caregiver Present: Yes (bre)  Referring Practitioner: Sonia  Referral Date : 12/31/23  Diagnosis: seizures  Follows Commands: Impaired (slow processing)  Subjective  Subjective: Pt supine in bed & willing to participate. Pt is lethargic, requiring cues to keep eyes open.         Social/Functional History  Social/Functional History  Lives With: Alone  Type of Home: House  Home Layout: One level  Home Access: Level entry  Bathroom Shower/Tub: Walk-in shower  Bathroom Toilet: Standard (uses BSC at night)  Bathroom Equipment: Shower chair, Commode  Home Equipment: Walker, rolling  Has the patient had two or more falls in the past year or any fall with injury in the past year?: Yes (3 falls)  ADL Assistance: Needs assistance (bre assists with shower transfer only. Pt IND bathing, dressing, toileting)  Homemaking Responsibilities: No  Ambulation Assistance: Independent (with RW)  Transfer Assistance: Independent  Active : No  Occupation: Retired  Additional Comments: family with pt 8 am- 9 pm every day. family watches pt on camera at night. PLOF info provided by doe'dtr  Vision/Hearing  Vision  Vision: Within Functional Limits  Hearing  Hearing: Within functional limits    Cognition   Orientation  Orientation Level: Oriented to person;Disoriented to place;Disoriented to time;Disoriented to situation  Cognition  Cognition Comment: pt has dementia at baseline. per bre, pt typically oriented to place & situation, not to time.     Objective   Pulse: 64  Heart  Rate Source: Monitor  BP: (!) 144/88  BP Location: Left upper arm  BP Method: Automatic  Patient Position: Sitting  MAP (Calculated): 107  Respirations: 17  SpO2: 98 %  O2 Device: None (Room air)  Temp: 98.2 °F (36.8 °C)              AROM RLE (degrees)  RLE AROM: WFL  AROM LLE (degrees)  LLE AROM : WFL  Strength RLE  Strength RLE: WFL  Strength LLE  Strength LLE: WFL        Bed Mobility Training  Bed Mobility Training: Yes  Supine to Sit: Maximum assistance (HOB elevated)  Scooting: Total assistance (seated)  Balance  Sitting - Static:  (pt sat at EOB for ~10 min. required mod A initially, progressing to SBA.)  Standing: Impaired  Standing - Static:  (max A X 2 for partial stand with RW (partial stand for ~10-15 sec x 3))  Transfer Training  Transfer Training: Yes  Sit to Stand: Assist X2 (max A x 2 from bed to RW (performed x 3) & from bed to kathy stedy. pt achieved only partial stance.)  Stand to Sit: Assist X2  Stand Pivot Transfers:  (unable to attempt with RW d/t poor standing balance)  Bed to Chair: Total assistance (via kathy stedy)  Gait Training  Gait Training: No (pt unable)                                                                      AM-PAC - Mobility    AM-PAC Basic Mobility - Inpatient   How much help is needed turning from your back to your side while in a flat bed without using bedrails?: A Lot  How much help is needed moving from lying on your back to sitting on the side of a flat bed without using bedrails?: A Lot  How much help is needed moving to and from a bed to a chair?: Total  How much help is needed standing up from a chair using your arms?: Total  How much help is needed walking in hospital room?: Total  How much help is needed climbing 3-5 steps with a railing?: Total  AM-PAC Inpatient Mobility Raw Score : 8  AM-PAC Inpatient T-Scale Score : 28.52  Mobility Inpatient CMS 0-100% Score: 86.62  Mobility Inpatient CMS G-Code Modifier : CM              Goals  Short Term Goals  Time Frame

## 2024-01-02 NOTE — PROCEDURES
Vipul Sarabia is a 83 y.o. male patient.  No diagnosis found.  Past Medical History:   Diagnosis Date    Ankle fracture, right     Arthritis     Diastolic murmur     Gout     Heart valve problem     2011-long term ,no problem with    Hyperlipidemia     Hypertension     Pacemaker      Blood pressure 133/86, pulse 61, temperature 98.4 °F (36.9 °C), temperature source Temporal, resp. rate 16, height 1.829 m (6' 0.01\"), weight 86.4 kg (190 lb 7.6 oz), SpO2 97 %.    EEG awake and asleep    Date/Time: 1/2/2024 9:38 AM    Performed by: Quita Portillo MD  Authorized by: Hannah Scott APRN - CNP      CLINICAL HISTORY:  Mr. Sarabia is an 82yo man with known seizure disorder (presumably complex partial), diagnosed in 2017 and had been on Keppra 1500/1500 at home until March of this year (~9 months ago), which was stopped in hopes he did not require it any longer. He presented to ER with likely 3 or more complex partial seizures and is now post-ictal/post benzodiazepine.  His typical course after his seizures, according to his granddaughter who knows him well, is to be postictal for some time.  Clinical concern is for complex partial or simple partial seizures or subclinical status epilepticus.    FINDINGS: This is a routine 16 channel referential and bipolar video EEG. There is a background rhythm in the delta range, without a posterior dominant rhythm. Photic stimulation is performed without driving or abnormality.  Hyperventilation is not performed.  No epileptiform abnormalities are noted. No electrographic seizures are recorded. There is no asymmetry.    IMPRESSION:  This is an abnormal awake and drowsy EEG due to the presence of generalized slowing. Generalized background abnormalities are indicative of an underlying diffuse encephalopathy of nonspecific etiology. No focal or epileptiform abnormalities.      Quita Portillo MD  1/2/2024

## 2024-01-02 NOTE — PROGRESS NOTES
Speech Language Pathology  Facility/Department:[facility] 5T ORTHO/NEURO  Bedside Swallow Evaluation                                                       Name: Vipul Sarabia  : 1940  MRN: [id number]    Patient Diagnosis(es):   Patient Active Problem List    Diagnosis Date Noted    Complex partial seizure disorder (HCC) 2024    Epilepsy, focal (HCC) 2018    HTN (hypertension), benign 2018    Dyslipidemia 2018    Complex partial seizures with consciousness impaired (HCC) 2018    Dementia due to Alzheimer's disease (HCC) 2018    ETOH abuse 2018    Acute encephalopathy     Aspiration pneumonia (HCC)     Breakthrough seizure (HCC) 2017    Encephalopathy acute 2017    Respiratory failure (HCC) 2017    PNA (pneumonia) 2017    Macrocytosis 2017    Patellofemoral arthritis of left knee 10/16/2017    Quadriceps weakness 2016    Quadriceps tendon rupture 2015    Arthritis of knee, right 10/31/2013     Past Medical History:   Diagnosis Date    Ankle fracture, right     Arthritis     Diastolic murmur     Gout     Heart valve problem     -long term ,no problem with    Hyperlipidemia     Hypertension     Pacemaker      Past Surgical History:   Procedure Laterality Date    ANKLE SURGERY      RT    APPENDECTOMY      CARDIAC VALVE REPLACEMENT  2022    CHOLECYSTECTOMY      EYE SURGERY      bilateral cataracts    HERNIA REPAIR      rihr  x 2    KNEE ARTHROSCOPY Right 2013    KNEE SURGERY      QUADRACEPS TENDON REPAIR Left 2015    SHOULDER ARTHROSCOPY  2011    video arthroscopy shoulder debridement acromioplasty       Reason for Referral:  Vipul Sarabia  was referred for a Speech Therapy evaluation to assess swallow function.    History of Present Illness  Per admitting H&P: 2023  '83 y.o. male with a pmh of Alzheimer's, aortic stenosis s/p AVR in , seizure disorder who presents with Seizures  assessment of swallow function as appropriate.  Goal 2: Patient/caregiver will demonstrate understanding of swallowing concerns/recommendations.    Pt goal: did not state  Pt discharge goal: did not state    Total treatment time: 15 dys eval    Plan:  Continue per POC  Recommended diet: NPO, consider temporary alternative means nutrition/hydration  - oral hygiene via suction kit  - small amounts ice chips/h2o if fully alert    Discharge Recommendation: TBD closer to discharge  Discussed with Lorie POTTS  Needs met prior to leaving room, call light within reach    Darcie Mathis, SLP, [id number]  Pg. # [phone]    This document will serve as a discharge summary if patient discharges prior to next visit.

## 2024-01-02 NOTE — PLAN OF CARE
Problem: Discharge Planning  Goal: Discharge to home or other facility with appropriate resources  Outcome: Progressing   Pt involved in discharge planning. Barriers to discharge discussed with pt. Discharge learning needs identified. Discussed with pt any additional needed resources and transportation plans.    Problem: Safety - Adult  Goal: Free from fall injury  Outcome: Progressing   All fall precautions in place. Bed locked and in lowest position with alarm on. Overbed table and personal belonings within reach. Call light within reach and patient instructed to use call light for assistance. Non-skid socks on.

## 2024-01-02 NOTE — PROGRESS NOTES
day  Current Treatment Recommendations: Functional mobility training     Restrictions  Position Activity Restriction  Other position/activity restrictions: up as tolerated, seizure precautions    Subjective   General  Chart Reviewed: Yes, Orders, Progress Notes  Patient assessed for rehabilitation services?: Yes  Additional Pertinent Hx: 82 y/o man with a history of seizures (abruptly discontinued Keppra March 2023) who presents with breakthrough seizures and most likely post-ictal encephalopathy  Family / Caregiver Present: Yes (doe'dtr Cass)  Referring Practitioner: Hannah Scott, STEVEN - CNP Acknowledge New  Diagnosis: seizures  Subjective  Subjective: Pt semi-supine on OT/PT appraoch and agreed to eval.  General Comment  Comments: Pt g'dtr present t/o eval, and providing PLOF info.  Pt extremely lethargic at start of session, becoming more alert with activity and upright posture. Noted pt w/ oral secretions and difficulty spitting during oral hygiene; RN aware and SLP eval has been ordered.  pt denies pain  Social/Functional History  Social/Functional History  Lives With: Alone  Type of Home: House  Home Layout: One level  Home Access: Level entry  Bathroom Shower/Tub: Walk-in shower  Bathroom Toilet: Standard (uses BSC at night)  Bathroom Equipment: Shower chair, Commode  Home Equipment: Walker, rolling  Has the patient had two or more falls in the past year or any fall with injury in the past year?: Yes (3 falls)  ADL Assistance: Needs assistance (rbe assists with shower transfer only. Pt IND bathing, dressing, toileting)  Homemaking Responsibilities: No  Ambulation Assistance: Independent (with RW)  Transfer Assistance: Independent  Active : No  Occupation: Retired  Additional Comments: family with pt 8 am- 9 pm every day. family watches pt on camera at night. PLOF info provided by g'dtr       Objective   Safety Devices  Type of Devices: Call light within reach;Chair alarm in place;Gait belt;Left  in chair;Nurse notified  Restraints  Restraints Initially in Place: No  Bed Mobility Training  Bed Mobility Training: Yes  Supine to Sit: Maximum assistance (HOB elevated)  Scooting: Total assistance (seated)  Balance  Sitting - Static:  (pt sat at EOB for ~10 min. required mod A initially, progressing to SBA.)  Standing: Impaired  Standing - Static:  (max A X 2 for partial stand with RW (partial stand for ~10-15 sec x 3))  Transfer Training  Transfer Training: Yes  Sit to Stand: Assist X2 (max A x 2 from bed to RW (performed x 3) & from bed to kathy stedy. pt achieved only partial stance.)  Stand to Sit: Assist X2  Stand Pivot Transfers:  (unable to attempt with RW d/t poor standing balance)  Bed to Chair: Total assistance (via kathy stedy)  Gait Training  Gait Training: No (pt unable)        ADL  Grooming: Moderate assistance (s/u w/ verbal cueing washcloth to face at EOB; S/U for oral care seated in recliner - cues for sequencing and use of oral swab, and spitting)  LE Dressing: Dependent/Total  LE Dressing Skilled Clinical Factors: donning hosp socks  Functional Mobility: Dependent/Total (condom cath)  Functional Mobility Skilled Clinical Factors: Sup>sit EOB MaxA x1 w/ HOB raised. Pt completed transfers in session requiring A x2. Assist for each: sit<>stand EOB<>RW 3x, sit<>stand EOB <> SaraStedy. While seated EOB pt initially req ModA- progressing to SBA within session. W/ attempts to stand, pt unable to attain upright stance, and req MaxA x2 for ea. appx 15s.  Skin Care: N/A     Activity Tolerance  Activity Tolerance: Patient tolerated evaluation without incident        Vision  Vision: Within Functional Limits  Hearing  Hearing: Within functional limits  Cognition  Cognition Comment: pt has dementia at baseline. per bre, pt typically oriented to place & situation, not to time.  Orientation  Orientation Level: Oriented to person;Disoriented to place;Disoriented to time;Disoriented to situation

## 2024-01-02 NOTE — CONSULTS
Clinical Pharmacy Consult Note  Medication History     Admit Date: 12/30/2023    Pharmacy consulted to verify home medication list by Dr. Victoria.    List of of current medications patient is taking is complete. Home Medication list in EPIC updated to reflect changes noted below.    Source of information: Rx dispense history (Surescripts)    [facility]'s [facility]: [facility]     Changes made to medication list:   Medications removed: (include reason, ex: therapy completed, patient no longer taking, etc.)  Levetiracetam - last filled Feb 2023; stopped abruptly in Mar 2023 per neurology   Lisinopril - reported as not taking to RN on admission, last filled Jan 2023  Atorvastatin - pt taking rosuvastatin  Indomethacin - last filled 2021 per Rx dispense history   Medications added:   Rosuvastatin 20mg po daily     Current Outpatient Medications   Medication Instructions    allopurinol (ZYLOPRIM) 200 mg, Oral, DAILY    aspirin 81 MG EC tablet 1 tablet, Oral, DAILY    finasteride (PROSCAR) 5 mg, Oral, DAILY, nightly    Multiple Vitamins-Minerals (THERAPEUTIC MULTIVITAMIN-MINERALS) tablet 1 tablet, Oral, DAILY, morning    rosuvastatin (CRESTOR) 20 mg, Oral, NIGHTLY    tamsulosin (FLOMAX) 0.4 mg, Oral, DAILY, nightly    vitamin D 2,000 Units, Oral, DAILY, morning       Please call with questions--  Trav YoderD, [facility]  Wireless: [phone]   1/2/2024 10:02 AM

## 2024-01-02 NOTE — PLAN OF CARE
Problem: Pain  Goal: Verbalizes/displays adequate comfort level or baseline comfort level  1/1/2024 1912 by Yumiko Mayen, RN  Outcome: Progressing  Note: Pt. Managing pain per MAR.     Problem: Safety - Adult  Goal: Free from fall injury  1/1/2024 1912 by Yumiko Mayen, RN  Outcome: Progressing  Note: All fall precautions in place and call light is within reach.

## 2024-01-02 NOTE — PROGRESS NOTES
Pt. Oriented to self only. VSS on RA with exception to elevated BP. No seizure activity noted overnight. Incontinence care as needed. All fall precautions in place and call light is within reach.

## 2024-01-02 NOTE — CARE COORDINATION
Case Management Assessment  Initial Evaluation    Date/Time of Evaluation: 1/2/2024 5:06 PM  Assessment Completed by: Maylin Juárez RN    If patient is discharged prior to next notation, then this note serves as note for discharge by case management.    Patient Name: Vipul Sarabia                   YOB: 1940  Diagnosis: Seizures (HCC) [R56.9]  Seizure (HCC) [R56.9]                   Date / Time: 12/30/2023 12:03 PM    Patient Admission Status: Inpatient   Readmission Risk (Low < 19, Mod (19-27), High > 27): Readmission Risk Score: 13.3    Current PCP: Joel Aj MD  PCP verified by CM? No    Chart Reviewed: Yes      History Provided by: Child/Family (grand daughter Cass)  Patient Orientation: Self    Patient Cognition: Dementia / Early Alzheimer's    Hospitalization in the last 30 days (Readmission):  No    If yes, Readmission Assessment in  Navigator will be completed.    Advance Directives:      Code Status: Full Code   Patient's Primary Decision Maker is: Legal Next of Kin      Discharge Planning:    Patient lives with: Alone Type of Home: House  Primary Care Giver: Family  Patient Support Systems include: Family Members   Current Financial resources: Medicare  Current community resources: None  Current services prior to admission: Durable Medical Equipment            Current DME: Walker            Type of Home Care services:  None    ADLS  Prior functional level: Independent in ADLs/IADLs  Current functional level: Assistance with the following:, Bathing, Dressing, Toileting, Mobility    PT AM-PAC: 8 /24  OT AM-PAC: 11 /24    Family can provide assistance at DC: Yes  Would you like Case Management to discuss the discharge plan with any other family members/significant others, and if so, who? No  Plans to Return to Present Housing: Unknown at present  Other Identified Issues/Barriers to RETURNING to current housing: none  Potential Assistance needed at discharge: Skilled

## 2024-01-02 NOTE — PROGRESS NOTES
V2.0    [facility] [place]      Name:  Vipul Sarabia /Age/Sex: 1940  (83 y.o. male)   MRN & CSN:  [id number] & [id number] Encounter Date/Time: 2024 7:30 AM EST   Location:  550/5505-01 PCP: Joel Aj MD     Attending:Rosemary Victoria MD       Hospital Day: 4    Assessment and Recommendations   Vipul Sarabia is a 83 y.o. male with pmh of Alzheimer's, aortic stenosis s/p AVR who presents with Complex partial seizures with consciousness impaired (HCC)      Plan:     Acute metabolic encephalopathy with underlying dementia  -Discussed with family.  At baseline patient has some mild dementia but is functional.  At present patient is very lethargic  -Patient has a history of sundowning    Epilepsy with recurrent seizures  -Neurology consult appreciated.  Family refused continuous EEG.  Regular EEG result pending  -On Keppra at home 1500 mg twice daily( was not taking it )   Family does not want to change medication  -PT OT    Lactic acidosis  -Most likely secondary to seizure    History of aortic stenosis s/p AVR    BPH  -On Flomax and finasteride.  Reorder    Hypertension  -Monitor blood pressure.  Was on lisinopril in the past.  If blood pressure climbs up we will restart lisinopril    Hyperlipidemia  -Continue statin      Diet ADULT DIET; Regular   DVT Prophylaxis [x] Lovenox, []  Heparin, [] SCDs, [] Ambulation,  [] Eliquis, [] Xarelto  [] Coumadin   Code Status Full Code   Disposition From: home  Expected Disposition: possibly home  Estimated Date of Discharge: 1-2 days  Patient requires continued admission due to eeg pending/continue         Personally reviewed Lab Studies and Imaging     Discussed management of the case with case management.  Awaiting therapy recommendations.  Discussed with granddaughter  Discussed with pharmacy.  Medications optimized            Drugs that require monitoring for toxicity include Keppra and the method of monitoring was BMP        Subjective:  of the orbits is unremarkable. SINUSES: The paranasal sinuses and mastoid air cells are clear. SOFT TISSUES/SKULL:  No lytic or blastic osseous lesions are identified.     No acute intracranial process identified. The findings were sent to the Radiology Results Communication Center at 8:27 am on 12/30/2023 to be communicated to a licensed caregiver. The findings were subsequently relayed to Dr. Rios by the CORE team at 8:34 a.m. on 12/30/2023.     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  The patient has undergone interval TAVR.  The lungs are without acute focal process.  There is no effusion or pneumothorax. The cardiomediastinal silhouette is stable. The osseous structures are stable.     No acute process.       CBC:   Recent Labs     12/30/23  0809 12/31/23  0529   WBC 6.4 4.8   HGB 12.6* 10.3*   PLT 91* 60*     BMP:    Recent Labs     12/30/23  0809 12/31/23  0529    142   K 3.4* 3.6    106   CO2 15* 27   BUN 16 12   CREATININE 1.0 0.9   GLUCOSE 135* 86     Hepatic:   Recent Labs     12/30/23  0809   AST 39*   ALT 23   BILITOT 0.5   ALKPHOS 105     Lipids: No results found for: \"CHOL\", \"HDL\", \"TRIG\"  Hemoglobin A1C: No results found for: \"LABA1C\"  TSH: No results found for: \"TSH\"  Troponin: No results found for: \"TROPONINT\"  Lactic Acid:   Recent Labs     12/30/23  0809   LACTA 11.6*     BNP: No results for input(s): \"PROBNP\" in the last 72 hours.  UA:  Lab Results   Component Value Date/Time    NITRU Negative 12/30/2023 08:37 AM    COLORU Yellow 12/30/2023 08:37 AM    PHUR 6.0 12/30/2023 08:37 AM    PHUR 6.0 12/30/2023 08:37 AM    LABCAST 1-3 Fine Gran. 12/01/2017 10:26 AM    WBCUA 0-2 12/30/2023  08:37 AM    RBCUA None seen 12/30/2023 08:37 AM    BACTERIA 1+ 12/30/2023 08:37 AM    CLARITYU Clear 12/30/2023 08:37 AM    SPECGRAV 1.015 12/30/2023 08:37 AM    LEUKOCYTESUR Negative 12/30/2023 08:37 AM    UROBILINOGEN 0.2 12/30/2023 08:37 AM    BILIRUBINUR Negative 12/30/2023 08:37 AM    BLOODU Negative 12/30/2023 08:37 AM    GLUCOSEU Negative 12/30/2023 08:37 AM    KETUA Negative 12/30/2023 08:37 AM    AMORPHOUS Rare 12/01/2017 10:26 AM     Urine Cultures: No results found for: \"LABURIN\"  Blood Cultures:   Lab Results   Component Value Date/Time    BC No growth after 5 days of incubation. 12/01/2017 10:30 AM     Lab Results   Component Value Date/Time    BLOODCULT2 No growth after 5 days of incubation. 12/01/2017 10:57 AM     Organism: No results found for: \"ORG\"      Electronically signed by Rosemary Victoria MD on 1/2/2024 at 7:30 AM

## 2024-01-02 NOTE — PROGRESS NOTES
NEUROCRITICAL CARE PROGRESS NOTE       Patient Name: Vipul Sarabia YOB: 1940   Sex: Male Age: 83 yrs     CC / Reason for Consult: Seizure    Changes over last 24 hours:   -Patient continues to be somnolent. Granddaughter thinks he is more tired today.  -Routine EEG completed and read, shows generalized slowing   -Afebrile, no leukocytosis     ROS: Patient not really awake enough to contribute to a full ROS. Denies pain.     ASSESSMENT & RECOMMENDATIONS   Assessment:  -Vipul Sarabia is an 82 y/o man with a history of complex partial seizures (abruptly discontinued Keppra March 2023) who presents with breakthrough seizures and most likely post-ictal encephalopathy.    -It would be prudent to exclude NCSE in this context however family not agreeable to cvEEG at this time. Routine EEG with generalized slowing which is nonspecific. He continues to be intermittently somnolent. His granddaughter tells me that he can behave this way after his seizures, especially when he is hospitalized for multiple days (now on day 4).     -Suspect that his ongoing encephalopathy is more likely secondary to hypoactive delirium, dementia and prolonged hospitalization. Granddaughter at bedside (who is his caretaker) agrees. Still do not feel there is an indication for cvEEG right now as he is able to awaken and answer my questions and follow simple commands albeit he is mildly confused and hard to keep awake.    -He cannot get and MRI due to an incompatible pacemaker.     Plan:  -Continue Keppra 1500mg BID  -PT/OT today, would be best to for him to get out of bed and move. Discussed with nursing  -Delirium precautions:  ->Frequent reorientation to time, place and date.   ->Open blinds during the day and close them at night   ->Night time noise should be reduced  ->Allow family to visit as much as possible during the day and avoid visitation at night  ->Consolidate care and other medical procedures including the  symmetrically  CN11: Traps full strength on shoulder shrug  CN12: Tongue midline with protrusion    Motor Exam:   R  L    Deltoid 3 5   Biceps 4 5   Triceps 4 5   Wrist extension  4 5   Interossei 5 5      R  L    Hip flexion  4 4   Hip extension  4 4   Knee flexion  4 4   Knee extension  4 4   Ankle dorsiflexion  4 4   Ankle plantar flexion  4 4     Sensory: light touch intact and symmetric in all 4 extremities.  No sensory extinction on bilateral simultaneous stimulation  Cerebellar/coordination: finger nose finger normal without ataxia with LUE, would not perform with RUE due to pain  Tone: normal in all 4 extremities  Gait: deferred at this time    OTHER SYSTEMS:  Cardiovascular: Warm, appears well perfused   Respiratory: Easy, non-labored respiratory pattern   Abdominal: Abdomen is without distention   Extremities: Upper and lower extremities are atraumatic in appearance without deformity. No swelling or erythema    I spent 34 minutes in the care of this patient.  Over 50% of that time was in face-to-face counseling regarding disease process, diagnostic testing, preventative measures, and answering patient and family questions.

## 2024-01-03 ENCOUNTER — APPOINTMENT (OUTPATIENT)
Dept: GENERAL RADIOLOGY | Age: 84
DRG: 101 | End: 2024-01-03
Attending: PSYCHIATRY & NEUROLOGY
Payer: MEDICARE

## 2024-01-03 LAB
DEPRECATED RDW RBC AUTO: 14 % (ref 12.4–15.4)
HCT VFR BLD AUTO: 34 % (ref 40.5–52.5)
HGB BLD-MCNC: 11.2 G/DL (ref 13.5–17.5)
MCH RBC QN AUTO: 33.8 PG (ref 26–34)
MCHC RBC AUTO-ENTMCNC: 33.1 G/DL (ref 31–36)
MCV RBC AUTO: 102.3 FL (ref 80–100)
PLATELET # BLD AUTO: 58 K/UL (ref 135–450)
PMV BLD AUTO: 8.7 FL (ref 5–10.5)
RBC # BLD AUTO: 3.32 M/UL (ref 4.2–5.9)
WBC # BLD AUTO: 6.3 K/UL (ref 4–11)

## 2024-01-03 PROCEDURE — 6370000000 HC RX 637 (ALT 250 FOR IP): Performed by: INTERNAL MEDICINE

## 2024-01-03 PROCEDURE — 73070 X-RAY EXAM OF ELBOW: CPT

## 2024-01-03 PROCEDURE — 97530 THERAPEUTIC ACTIVITIES: CPT

## 2024-01-03 PROCEDURE — 85027 COMPLETE CBC AUTOMATED: CPT

## 2024-01-03 PROCEDURE — 6370000000 HC RX 637 (ALT 250 FOR IP): Performed by: STUDENT IN AN ORGANIZED HEALTH CARE EDUCATION/TRAINING PROGRAM

## 2024-01-03 PROCEDURE — 6360000002 HC RX W HCPCS: Performed by: PSYCHIATRY & NEUROLOGY

## 2024-01-03 PROCEDURE — 6370000000 HC RX 637 (ALT 250 FOR IP)

## 2024-01-03 PROCEDURE — 92526 ORAL FUNCTION THERAPY: CPT

## 2024-01-03 PROCEDURE — 2060000000 HC ICU INTERMEDIATE R&B

## 2024-01-03 PROCEDURE — 73030 X-RAY EXAM OF SHOULDER: CPT

## 2024-01-03 PROCEDURE — 95713 VEEG 2-12 HR CONT MNTR: CPT

## 2024-01-03 PROCEDURE — 2580000003 HC RX 258: Performed by: STUDENT IN AN ORGANIZED HEALTH CARE EDUCATION/TRAINING PROGRAM

## 2024-01-03 PROCEDURE — 97535 SELF CARE MNGMENT TRAINING: CPT

## 2024-01-03 PROCEDURE — 97110 THERAPEUTIC EXERCISES: CPT

## 2024-01-03 PROCEDURE — 99232 SBSQ HOSP IP/OBS MODERATE 35: CPT

## 2024-01-03 PROCEDURE — 36415 COLL VENOUS BLD VENIPUNCTURE: CPT

## 2024-01-03 PROCEDURE — 95716 VEEG EA 12-26HR CONT MNTR: CPT

## 2024-01-03 PROCEDURE — 6360000002 HC RX W HCPCS: Performed by: STUDENT IN AN ORGANIZED HEALTH CARE EDUCATION/TRAINING PROGRAM

## 2024-01-03 RX ORDER — LEVETIRACETAM 750 MG/1
1500 TABLET ORAL 2 TIMES DAILY
Status: DISCONTINUED | OUTPATIENT
Start: 2024-01-03 | End: 2024-01-04 | Stop reason: HOSPADM

## 2024-01-03 RX ADMIN — FINASTERIDE 5 MG: 5 TABLET, FILM COATED ORAL at 08:57

## 2024-01-03 RX ADMIN — ENOXAPARIN SODIUM 40 MG: 100 INJECTION SUBCUTANEOUS at 09:19

## 2024-01-03 RX ADMIN — LEVETIRACETAM 1500 MG: 750 TABLET, FILM COATED ORAL at 20:33

## 2024-01-03 RX ADMIN — ATORVASTATIN CALCIUM 80 MG: 80 TABLET, FILM COATED ORAL at 20:33

## 2024-01-03 RX ADMIN — ACETAMINOPHEN 650 MG: 325 TABLET ORAL at 08:57

## 2024-01-03 RX ADMIN — SODIUM CHLORIDE, PRESERVATIVE FREE 10 ML: 5 INJECTION INTRAVENOUS at 08:58

## 2024-01-03 RX ADMIN — TAMSULOSIN HYDROCHLORIDE 0.4 MG: 0.4 CAPSULE ORAL at 20:33

## 2024-01-03 RX ADMIN — SODIUM CHLORIDE, PRESERVATIVE FREE 10 ML: 5 INJECTION INTRAVENOUS at 20:36

## 2024-01-03 RX ADMIN — LEVETIRACETAM 1500 MG: 500 INJECTION, SOLUTION, CONCENTRATE INTRAVENOUS at 08:57

## 2024-01-03 NOTE — PROGRESS NOTES
Physical Therapy  Facility/Department: [facility] ORTHO/NEURO  Daily Treatment Note  NAME: Vipul Sarabia  : 1940  MRN: [id number]    Date of Service: 1/3/2024    Discharge Recommendations:  IP Rehab   PT Equipment Recommendations  Equipment Needed: No    Patient Diagnosis(es): The encounter diagnosis was Complex partial seizure disorder (HCC).    Assessment   Assessment: Pt continues to function well below baseline. Requires assist x 2  to stand & is unable to ambulate. Pt is a high fall risk. Recommend further inpt PT upon D/C. Will follow per plan of care.  Equipment Needed: No     Plan    Physical Therapy Plan  General Plan:  (2-5)  Current Treatment Recommendations: Balance training;Functional mobility training;Transfer training;Endurance training;Gait training;Safety education & training;Therapeutic activities     Restrictions  Position Activity Restriction  Other position/activity restrictions: up as tolerated, seizure precautions     Subjective    Subjective  Subjective: Pt supine in bed & agreeable to PT. Pt remains lethargic & confused. Son feels its d/t keppra.  Pain: pt denies pain  Orientation  Orientation Level: Oriented to person;Disoriented to place;Disoriented to time;Disoriented to situation (Pt oriented to type of place when given 3 choices. Pt reoriented by therapist)  Cognition  Overall Cognitive Status: Exceptions  Following Commands: Follows one step commands with repetition;Follows one step commands with increased time  Attention Span: Difficulty attending to directions;Difficulty dividing attention;Attends with cues to redirect  Memory: Decreased recall of recent events;Decreased recall of precautions;Decreased recall of biographical Information;Decreased short term memory  Safety Judgement: Decreased awareness of need for safety;Decreased awareness of need for assistance  Problem Solving: Assistance required to generate solutions;Assistance required to implement solutions;Assistance  without using bedrails?: A Lot  How much help is needed moving to and from a bed to a chair?: Total  How much help is needed standing up from a chair using your arms?: Total  How much help is needed walking in hospital room?: Total  How much help is needed climbing 3-5 steps with a railing?: Total  AM-PAC Inpatient Mobility Raw Score : 8  AM-PAC Inpatient T-Scale Score : 28.52  Mobility Inpatient CMS 0-100% Score: 86.62  Mobility Inpatient CMS G-Code Modifier : CM         Therapy Time   Individual Concurrent Group Co-treatment   Time In 1436         Time Out 1500         Minutes 24                 Aurea Mckeon, PT

## 2024-01-03 NOTE — PLAN OF CARE
Problem: Discharge Planning  Goal: Discharge to home or other facility with appropriate resources  Outcome: Progressing   Pt involved in discharge planning. Barriers to discharge discussed with pt. Discharge learning needs identified. Discussed with pt any additional needed resources and transportation plans.    Problem: Safety - Adult  Goal: Free from fall injury  All fall precautions in place. Bed locked and in lowest position with alarm on. Overbed table and personal belonings within reach. Call light within reach and patient instructed to use call light for assistance. Non-skid socks on.

## 2024-01-03 NOTE — PROGRESS NOTES
APRN notified patient removed leads for CEEG  -melatonin previously given to aid in sleep/calm patient without effect  -bilateral soft wrist restraints  -night attending aware    STEVEN Thompson - NP

## 2024-01-03 NOTE — CARE COORDINATION
KAREN spoke with patient's son Johnny at bedside.  He would like a referral to [facility].  KAREN spoke with Gin in intake at [facility] for referral information.  Johnny would also like referral to [facility] as a back up plan.    Maylin Juárez RN, BSN,    Ortho/Neuro   [phone]     Statement Selected

## 2024-01-03 NOTE — PROGRESS NOTES
CONTINUOUS EEG    Name:  Vipul Sarabia  Medical Record Number:  [id number]  Age: 83 y.o.   Gender: male  : 1940  Today's Date:  1/3/2024  Room:  [address] [place] [address]  Vital Signs   BP (!) 146/80   Pulse 64   Temp 98 °F (36.7 °C) (Oral)   Resp 15   Ht 1.829 m (6' 0.01\")   Wt 86.4 kg (190 lb 7.6 oz)   SpO2 98%   BMI 25.83 kg/m²           Continuous EEG Testing End Time:   10:05 AM/PM      Comments:  Per Felipe Mera patient test is completed. [facility] contacted 10:05 via telephone. Scalp assessment performed by this EEG techLorie R.N.       Plan of care:Removed al leads from pt's scalp no redness found by this EEG tech.      Electronically signed by Maria D Woods on 1/3/2024 at 10:26 AM

## 2024-01-03 NOTE — PROGRESS NOTES
Pt alert, oriented and disoriented at times, vss on room air with exception to intermittent elevated bp. Pt up to chair this shift with a heavy x2 assist using stedy, pt tolerated activity. Pt voiding adequately via incontinence and external catheter. Pt did not have BM this shift. Pt is currently NPO. All fall precautions in place, call light within pt reach .

## 2024-01-03 NOTE — PROGRESS NOTES
Patient alert to person. VSS on room air with exception to elevated BP. Pt voiding via external catheter. Pt is currently NPO. Pt is tolerating sips of water with medication. PRN medication administered per MAR. Bilateral soft wrist restraints in place. Fall and seizure precautions in place. Call light within reach. Plan of care continues.

## 2024-01-03 NOTE — PROCEDURES
CONTINUOUS VIDEO ELECTROENCEPHALOGRAM (cvEEG) REPORT    Identifying Information:  Name: Vipul Sarabia  MRN: [id number]  : 1940  Attending Physician: Rosemary Victoria MD  Interpreting Physician: Bonita Virgen MD  Reporting Physician: BONITA VIRGEN MD,     Clinical History:  Vipul Sarabia is an 83 y.o. male who was admitted on 2023 with a primary diagnosis of possible seizures    Past Medical History:  Past Medical History:   Diagnosis Date    Ankle fracture, right     Arthritis     Diastolic murmur     Gout     Heart valve problem     -long term ,no problem with    Hyperlipidemia     Hypertension     Pacemaker        Current Medications:    finasteride  5 mg Oral Daily    tamsulosin  0.4 mg Oral Nightly    atorvastatin  80 mg Oral Nightly    sodium chloride flush  5-40 mL IntraVENous 2 times per day    enoxaparin  40 mg SubCUTAneous Daily    levETIRAcetam  1,500 mg IntraVENous Q12H       cEEG start date & time: 2024 at 8 pm  cEEG end date & time: 1-3-2024 at 6:30 am    Indication:  cEEG was initiated for monitoring and localization of seizures as the etiology of the altered mental status. It was available for review at the bedside as well as via remote access for the entire period of monitoring.    Technical Summary:  32 channels of continuous EEG and video were recorded in a digital format on a patient who is reported to be drowsy during the recording.     The background rhythm consists of 5-7 Hz activity, PDR is intermittently present. State changes seen without clear sleep architecture.    The recording is remarkable for the presence of:   Focal slowing in the form of intermittent polymorphic delta / theta activity over the right frontal head region.    During the recording:   No epileptiform discharges or seizures were detected.   Video was reviewed intermittently at times when significant amounts of EMG artifact were present.    The EKG monitoring channel did not detect any

## 2024-01-03 NOTE — PROGRESS NOTES
Speech Language Pathology  Facility/Department:[facility] ORTHO/NEURO  Dysphagia treatment note                                                     Name: Vipul Sarabia  : 1940  MRN: [id number]    Patient Diagnosis(es):   Patient Active Problem List    Diagnosis Date Noted    Complex partial seizure disorder (HCC) 2024    Epilepsy, focal (HCC) 2018    HTN (hypertension), benign 2018    Dyslipidemia 2018    Complex partial seizures with consciousness impaired (HCC) 2018    Dementia due to Alzheimer's disease (HCC) 2018    ETOH abuse 2018    Acute encephalopathy     Aspiration pneumonia (HCC)     Breakthrough seizure (HCC) 2017    Encephalopathy acute 2017    Respiratory failure (HCC) 2017    PNA (pneumonia) 2017    Macrocytosis 2017    Patellofemoral arthritis of left knee 10/16/2017    Quadriceps weakness 2016    Quadriceps tendon rupture 2015    Arthritis of knee, right 10/31/2013     Past Medical History:   Diagnosis Date    Ankle fracture, right     Arthritis     Diastolic murmur     Gout     Heart valve problem     -long term ,no problem with    Hyperlipidemia     Hypertension     Pacemaker      Past Surgical History:   Procedure Laterality Date    ANKLE SURGERY      RT    APPENDECTOMY      CARDIAC VALVE REPLACEMENT  2022    CHOLECYSTECTOMY      EYE SURGERY      bilateral cataracts    HERNIA REPAIR      rihr  x 2    KNEE ARTHROSCOPY Right 2013    KNEE SURGERY      QUADRACEPS TENDON REPAIR Left 2015    SHOULDER ARTHROSCOPY  2011    video arthroscopy shoulder debridement acromioplasty     History of Present Illness  Per admitting H&P: 2023  '83 y.o. male with a pmh of Alzheimer's, aortic stenosis s/p AVR in , seizure disorder who presents with Seizures (HCC).'    CXR: 2023  No acute process.     CT head: 2023  No acute intracranial process identified.     Date of

## 2024-01-03 NOTE — PROGRESS NOTES
CONTINUOUS EEG    Name:  Vipul Sarabia  Medical Record Number:  [id number]  Age: 83 y.o.   Gender: male  : 1940  Today's Date:  2024  Room:  [facility]-  Vital Signs   BP (!) 142/84   Pulse 65   Temp 98.2 °F (36.8 °C) (Oral)   Resp 17   Ht 1.829 m (6' 0.01\")   Wt 86.4 kg (190 lb 7.6 oz)   SpO2 97%   BMI 25.83 kg/m²           Continuous EEG Testing Start Time:  .      Comments: Geraldine at [facility] contacted 2015. Impedence on all leads are within normal limits. Today is the initial set up day for cvEEG. Patient head is NOT wrapped. Yumiko RODRIGUEZ  is aware that this patients cvEEG will be repositioned on  at . Yumiko RODRIGUEZ was notified at  by this EEG technician. Patient's head was placed on pillows upon completion of cvEEG placement.      Plan of Care: Begin monitoring.    Electronically signed by Tarsha Lynch on 2024 at 8:21 PM

## 2024-01-03 NOTE — PLAN OF CARE
Problem: Pain  Goal: Verbalizes/displays adequate comfort level or baseline comfort level  1/2/2024 1935 by Eduardo Laguna, RN  Outcome: Progressing  Pt managing pain per MAR     Problem: Safety - Adult  Goal: Free from fall injury  1/2/2024 1935 by Eduardo Laguna, RN  Outcome: Progressing   All fall precautions in place. Call light within reach.

## 2024-01-03 NOTE — PROGRESS NOTES
Occupational Therapy  Facility/Department: [facility] 5T ORTHO/NEURO  Daily Treatment    Name: Vipul Sarabia  : 1940  MRN: [id number]  Date of Service: 1/3/2024    Discharge Recommendations:  IP Rehab  OT Equipment Recommendations  Equipment Needed: No  Other: defer to next level of care     Patient Diagnosis(es): The encounter diagnosis was Complex partial seizure disorder (HCC).  Past Medical History:  has a past medical history of Ankle fracture, right, Arthritis, Diastolic murmur, Gout, Heart valve problem, Hyperlipidemia, Hypertension, and Pacemaker.  Past Surgical History:  has a past surgical history that includes Ankle surgery; eye surgery; Cholecystectomy; hernia repair; Shoulder arthroscopy (2011); Appendectomy; Knee arthroscopy (Right, 2013); Quadraceps tendon repair (Left, 2015); knee surgery; and Cardiac valve replacement (2022).    Treatment Diagnosis: impaired ADL and fxl mob 2/2 Sz    Assessment   Performance deficits / Impairments: Decreased functional mobility ;Decreased ADL status;Decreased endurance;Decreased safe awareness;Decreased cognition;Decreased strength;Decreased ROM;Decreased high-level IADLs;Decreased fine motor control;Decreased posture;Decreased coordination;Decreased balance  Assessment: Pt is 83y M from home w/ family supports and baseline dementia. At baseline pt has 8a-9p A / supv from family, and family has cameras set up at night to observe. At baseline g'dtr reports pt is IND w/ toileting, bathing, dressing; and req A for transfer into/out of shower. Pt is functioning well below reported baseline needing max A for supine to sit, max A x2 to stand with RW, unable to take functional steps at this time. He would benefit from skilled OT while inpt and after d/c, to maximize safety, IND, and fxl act franky needed for ADL and fxl moblity tasks. Will follow as inpt per POC  Treatment Diagnosis: impaired ADL and fxl mob 2/2 Sz  Prognosis: Fair  REQUIRES OT  precautions;Decreased recall of biographical Information;Decreased short term memory  Safety Judgement: Decreased awareness of need for safety;Decreased awareness of need for assistance  Problem Solving: Assistance required to generate solutions;Assistance required to implement solutions;Assistance required to correct errors made;Assistance required to identify errors made;Decreased awareness of errors  Insights: Decreased awareness of deficits  Initiation: Requires cues for some  Sequencing: Requires cues for some  Cognition Comment: pt has dementia at baseline. per bre, pt typically oriented to place & situation, not to time.  Orientation  Orientation Level: Oriented to person;Disoriented to place;Disoriented to time;Disoriented to situation (Pt oriented to type of place when given 3 choices. Pt reoriented by therapist)                                    Functional mobility: Unable to take steps/clear feet off floor despite Ax2 with gait belt, RW.    Sitting balance EOB: STATIC and DYNAMIC - min A to SBA.    Therapeutic Exercise  Pt completed 15-20 reps of B AAROM for ankle, knee, hip, digit, wrist, elbow, and shoulder flex/ext, supination/pronation, shoulder ER/IR (BUE supine, BLEs seated) after education. Pt and family educated to complete at least 3x/day, 10 reps increasing resistance and repetitions as able. Pt and family stated and demonstrated understanding.    Education: Role of OT, safe t/f training, safe use of DME, awareness of deficits, discharge planning, ADL as therapeutic exercise, importance of OOB, therex/AAROM, cognitive orientation       AM-PAC - ADL  AM-PAC Daily Activity - Inpatient   How much help is needed for putting on and taking off regular lower body clothing?: Total  How much help is needed for bathing (which includes washing, rinsing, drying)?: Total  How much help is needed for toileting (which includes using toilet, bedpan, or urinal)?: Total  How much help is needed for

## 2024-01-03 NOTE — PROGRESS NOTES
NEUROCRITICAL CARE PROGRESS NOTE       Patient Name: Vipul Sarabia YOB: 1940   Sex: Male Age: 83 yrs     CC / Reason for Consult: Seizure     Changes over last 24 hours:   -No seizures on cvEEG, focal slowing over the right frontal head region  -Exam improved this morning, more interactive  -Granddaughter showed me video of patient last night and he was much more awake, reading the newspaper   -Family is still concerned about his Keppra dose being too high  -Having significant pain with any passive ROM of the RUE, tells me his writs hurts but pain also seems to be more proximal    ROS: Right upper extremity pain with movement.     ASSESSMENT & RECOMMENDATIONS   Assessment:  -Vipul Sarabia is an 84 y/o man with a history of complex partial seizures (abruptly discontinued Keppra March 2023) who presents with breakthrough seizures and post-ictal encephalopathy.    -Patient continued to be somnolent into day 4 of his hospitalization. Though this was suspected to be due to hypoactive delirium, could not completely rule out non-convulsive seizures. He was hooked up to cvEEG for 12 hours which did not reveal any seizures, just focal right frontal slowing which essentially helped rule out any further seizures.     -Continue to suspect that his fluctuating exam is secondary to hypoactive delirium. He seems to have his days and nights mixed up. Granddaughter (who is his caretaker) is still very concerns that he is on too much Keppra and that is what is making him sleepy, even though he has tolerating this dose just fine in the past.     -He cannot get and MRI due to an incompatible pacemaker    Plan:  -Continue Keppra 1500mg BID for now. Instructed to follow up with Tamiko (his neurologist) outpatient to discuss med adjustments.   -PT/OT today, would be best to for him to get out of bed and move. Discussed with nursing  -Take down from cvEEG  -Please avoid using restraints  -Q4 hour neuro checks

## 2024-01-03 NOTE — PROGRESS NOTES
V2.0    [facility] Progress Note      Name:  Vipul Sarabia /Age/Sex: 1940  (83 y.o. male)   MRN & CSN:  [id number] & [id number] Encounter Date/Time: 1/3/2024 7:30 AM EST   Location:  5505/5505-01 PCP: Joel Aj MD     Attending:Rosemary Victoria MD       Hospital Day: 5    Assessment and Recommendations   Vipul Sarabia is a 83 y.o. male with pmh of Alzheimer's, aortic stenosis s/p AVR who presents with Complex partial seizures with consciousness impaired (HCC)-patient was admitted with 6 seizures.  Patient also has acute encephalopathy in the setting of underlying dementia, per family sundown's at home      Plan:     Acute metabolic encephalopathy with underlying dementia-slightly better.  More awake  -Patient has a history of sundowning  -At baseline patient's dementia is mild and compensated    Epilepsy with recurrent seizures  -Neurology consult appreciated.    - EEG shows some frontal focal disturbance   -On Keppra at home 1500 mg twice daily( was not taking it ) . Will differ dosage to neurology     -PT OT-consult noted    Dysphagia  -Patient tolerated diet slightly better.  Diet advanced to full liquid diet  -Speech to reevaluate today, consult appreciated    Lactic acidosis  -Most likely secondary to seizure    History of aortic stenosis s/p AVR    BPH  -On Flomax and finasteride.  Reordered    Hypertension- relatively controlled off meds  -Monitor blood pressure.  Was on lisinopril in the past.  If blood pressure climbs up we will restart lisinopril( when able to take po )    Hyperlipidemia  -Continue statin    Wrist swelling tenderness  -X-ray shows no fracture.  Monitor      Diet Diet NPO Exceptions are: Sips of Water with Meds   DVT Prophylaxis [x] Lovenox, []  Heparin, [] SCDs, [] Ambulation,  [] Eliquis, [] Xarelto  [] Coumadin   Code Status Full Code   Disposition From: home  Expected Disposition: Possibly rehab  Estimated Date of Discharge: 1-2 days  Patient requires  aortic arch and arch vessels.  No flow limiting stenosis of the innominate or subclavian arteries. CAROTID ARTERIES: No focal stenosis of the common carotid arteries. Atherosclerosis of the carotid bulbs and proximal cervical ICAs bilaterally. Approximately 50% stenosis at the origin of the left ICA with less than 50% stenosis of the right ICA.  Severe stenosis at the origin of the right external carotid artery. VERTEBRAL ARTERIES: There is a right dominant vertebral artery. Atherosclerosis contributes to severe stenosis at the origin of both vertebral arteries. SOFT TISSUES: No acute abnormality within the visualized portion the lungs or mediastinum. BONES: The osseous structures appear osteopenic.  No convincing acute osseous abnormality. CTA HEAD: ANTERIOR CIRCULATION: Atherosclerosis of the internal carotid arteries bilaterally without evidence of a significant stenosis.  No significant stenosis is seen of the anterior cerebral or middle cerebral arteries.  No aneurysm identified. POSTERIOR CIRCULATION: No significant stenosis of the vertebral, basilar, or posterior cerebral arteries. No aneurysm. OTHER: No dural venous sinus thrombosis on this non-dedicated study. BRAIN: No evidence of mass effect or midline shift.     1. Atherosclerosis contributes to severe stenosis at the origin of both vertebral arteries. 2. Atherosclerosis also contributes to 50% stenosis of the origin of the left cervical ICA with less than 50% stenosis of the right cervical ICA. 3. No significant stenosis or large vessel occlusion of the [id number].     CT HEAD WO CONTRAST    Result Date: 12/30/2023  EXAMINATION: CT OF THE HEAD WITHOUT CONTRAST  12/30/2023 8:15 am TECHNIQUE: CT of the head was performed without the administration of intravenous contrast. Automated exposure control, iterative reconstruction, and/or weight based adjustment of the mA/kV was utilized to reduce the radiation dose to as low as reasonably achievable.

## 2024-01-04 VITALS
OXYGEN SATURATION: 95 % | TEMPERATURE: 98.3 F | SYSTOLIC BLOOD PRESSURE: 171 MMHG | WEIGHT: 190.48 LBS | HEART RATE: 73 BPM | RESPIRATION RATE: 18 BRPM | HEIGHT: 72 IN | DIASTOLIC BLOOD PRESSURE: 88 MMHG | BODY MASS INDEX: 25.8 KG/M2

## 2024-01-04 LAB
DEPRECATED RDW RBC AUTO: 13.9 % (ref 12.4–15.4)
HCT VFR BLD AUTO: 34 % (ref 40.5–52.5)
HGB BLD-MCNC: 11.4 G/DL (ref 13.5–17.5)
MCH RBC QN AUTO: 33.4 PG (ref 26–34)
MCHC RBC AUTO-ENTMCNC: 33.4 G/DL (ref 31–36)
MCV RBC AUTO: 100 FL (ref 80–100)
PLATELET # BLD AUTO: 67 K/UL (ref 135–450)
PLATELET BLD QL SMEAR: ABNORMAL
PMV BLD AUTO: 9.2 FL (ref 5–10.5)
RBC # BLD AUTO: 3.4 M/UL (ref 4.2–5.9)
WBC # BLD AUTO: 5.9 K/UL (ref 4–11)

## 2024-01-04 PROCEDURE — 36415 COLL VENOUS BLD VENIPUNCTURE: CPT

## 2024-01-04 PROCEDURE — 6360000002 HC RX W HCPCS: Performed by: STUDENT IN AN ORGANIZED HEALTH CARE EDUCATION/TRAINING PROGRAM

## 2024-01-04 PROCEDURE — 6370000000 HC RX 637 (ALT 250 FOR IP)

## 2024-01-04 PROCEDURE — 92526 ORAL FUNCTION THERAPY: CPT

## 2024-01-04 PROCEDURE — 6370000000 HC RX 637 (ALT 250 FOR IP): Performed by: INTERNAL MEDICINE

## 2024-01-04 PROCEDURE — 85027 COMPLETE CBC AUTOMATED: CPT

## 2024-01-04 PROCEDURE — 6370000000 HC RX 637 (ALT 250 FOR IP): Performed by: STUDENT IN AN ORGANIZED HEALTH CARE EDUCATION/TRAINING PROGRAM

## 2024-01-04 PROCEDURE — 2580000003 HC RX 258: Performed by: STUDENT IN AN ORGANIZED HEALTH CARE EDUCATION/TRAINING PROGRAM

## 2024-01-04 RX ORDER — LISINOPRIL 5 MG/1
5 TABLET ORAL DAILY
Status: DISCONTINUED | OUTPATIENT
Start: 2024-01-04 | End: 2024-01-04 | Stop reason: HOSPADM

## 2024-01-04 RX ORDER — LEVETIRACETAM 750 MG/1
1500 TABLET ORAL 2 TIMES DAILY
Qty: 60 TABLET | Refills: 3 | Status: SHIPPED | OUTPATIENT
Start: 2024-01-04

## 2024-01-04 RX ORDER — LISINOPRIL 5 MG/1
5 TABLET ORAL DAILY
Qty: 30 TABLET | Refills: 3 | Status: SHIPPED | OUTPATIENT
Start: 2024-01-05

## 2024-01-04 RX ORDER — SENNOSIDES A AND B 8.6 MG/1
1 TABLET, FILM COATED ORAL NIGHTLY
Status: DISCONTINUED | OUTPATIENT
Start: 2024-01-04 | End: 2024-01-04 | Stop reason: HOSPADM

## 2024-01-04 RX ORDER — MECOBALAMIN 5000 MCG
5 TABLET,DISINTEGRATING ORAL NIGHTLY PRN
Qty: 30 TABLET | Refills: 0 | Status: SHIPPED | OUTPATIENT
Start: 2024-01-04 | End: 2024-02-03

## 2024-01-04 RX ADMIN — LISINOPRIL 5 MG: 5 TABLET ORAL at 09:51

## 2024-01-04 RX ADMIN — ACETAMINOPHEN 650 MG: 325 TABLET ORAL at 12:30

## 2024-01-04 RX ADMIN — SODIUM CHLORIDE, PRESERVATIVE FREE 10 ML: 5 INJECTION INTRAVENOUS at 09:48

## 2024-01-04 RX ADMIN — FINASTERIDE 5 MG: 5 TABLET, FILM COATED ORAL at 09:48

## 2024-01-04 RX ADMIN — LEVETIRACETAM 1500 MG: 750 TABLET, FILM COATED ORAL at 09:48

## 2024-01-04 RX ADMIN — ENOXAPARIN SODIUM 40 MG: 100 INJECTION SUBCUTANEOUS at 09:48

## 2024-01-04 ASSESSMENT — PAIN - FUNCTIONAL ASSESSMENT: PAIN_FUNCTIONAL_ASSESSMENT: PREVENTS OR INTERFERES SOME ACTIVE ACTIVITIES AND ADLS

## 2024-01-04 ASSESSMENT — PAIN DESCRIPTION - LOCATION: LOCATION: BACK

## 2024-01-04 ASSESSMENT — PAIN SCALES - GENERAL
PAINLEVEL_OUTOF10: 0
PAINLEVEL_OUTOF10: 4

## 2024-01-04 ASSESSMENT — PAIN DESCRIPTION - PAIN TYPE: TYPE: ACUTE PAIN

## 2024-01-04 ASSESSMENT — PAIN DESCRIPTION - ORIENTATION: ORIENTATION: LOWER

## 2024-01-04 ASSESSMENT — PAIN DESCRIPTION - DESCRIPTORS: DESCRIPTORS: ACHING;DISCOMFORT

## 2024-01-04 NOTE — PROGRESS NOTES
Patient alert to person. VSS on RA with exception to elevated BP. No neuro changes noted. Pt voiding via external catheter. Pt tolerating PO intake. Fall and seizure precautions in place. Call light within reach. Plan of care continues.

## 2024-01-04 NOTE — PLAN OF CARE
Problem: Skin/Tissue Integrity  Goal: Absence of new skin breakdown  Description: 1.  Monitor for areas of redness and/or skin breakdown  2.  Assess vascular access sites hourly  3.  Every 4-6 hours minimum:  Change oxygen saturation probe site  4.  Every 4-6 hours:  If on nasal continuous positive airway pressure, respiratory therapy assess nares and determine need for appliance change or resting period.  Outcome: Progressing   Pt free from skin breakdown  Problem: Safety - Adult  Goal: Free from fall injury  Outcome: Progressing   Fall precautions in place. Call light within reach

## 2024-01-04 NOTE — PROGRESS NOTES
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) 2018    ETOH abuse 2018    Acute encephalopathy     Aspiration pneumonia (HCC)     Breakthrough seizure (HCC) 2017    Encephalopathy acute 2017    Respiratory failure (HCC) 2017    PNA (pneumonia) 2017    Macrocytosis 2017    Patellofemoral arthritis of left knee 10/16/2017    Quadriceps weakness 2016    Quadriceps tendon rupture 2015    Arthritis of knee, right 10/31/2013     Past Medical History:   Diagnosis Date    Ankle fracture, right     Arthritis     Diastolic murmur     Gout     Heart valve problem     -long term ,no problem with    Hyperlipidemia     Hypertension     Pacemaker      Past Surgical History:   Procedure Laterality Date    ANKLE SURGERY      RT    APPENDECTOMY      CARDIAC VALVE REPLACEMENT  2022    CHOLECYSTECTOMY      EYE SURGERY      bilateral cataracts    HERNIA REPAIR      rihr  x 2    KNEE ARTHROSCOPY Right 2013    KNEE SURGERY      QUADRACEPS TENDON REPAIR Left 2015    SHOULDER ARTHROSCOPY  2011    video arthroscopy shoulder debridement acromioplasty     History of Present Illness  Per admitting H&P: 2023  '83 y.o. male with a pmh of Alzheimer's, aortic stenosis s/p AVR in , seizure disorder who presents with Seizures (HCC).'    CXR: 2023  No acute process.     CT head: 2023  No acute intracranial process identified.     Date of  swallowing concerns/recommendations.  1/3: Educated pt and granddaughter to purpose of visit, s/s of aspiration, concern if aspiration occurs and rationale for diet recommendation/strategies to reduce risk for aspiration.  Explained importance of feeding only fully alert and the importance of oral care.  Pt /granddaughter instructed to notify staff if any signs of aspiration emerge or dysphagia concerns.  Granddaughter stated comprehension   Cont goal  1/4: pt/granddaughter educated to recommendation for upgrade of diet to soft and bite sized. Reviewed strategies for increased safety of swallow. Granddaughter states good understanding  Goal met, cont to ensure consistency    Total treatment time: 17 dys treat    Plan:  Continue per POC  Recommended diet: upgrade to soft/bite sized/thin liquids- if any s/s of aspiration emerge, or there is respiratory decline,  make NPO until further evaluated by SLP  Medications in puree, crushed as able  Oral care     Discharge Recommendation: TBD closer to discharge  Discussed with Sayda POTTS  Needs met prior to leaving room, call light within reach    KARO HUSTON M.S./CCC-SLP #[id number]  Pg. # [phone]  This document will serve as a discharge summary if patient discharges prior to next visit.

## 2024-01-04 NOTE — DISCHARGE SUMMARY
V2.0  Discharge Summary    Name:  Vipul Sarabia /Age/Sex: 1940 (83 y.o. male)   Admit Date: 2023  Discharge Date: 24    MRN & CSN:  [id number] & [id number] Encounter Date and Time 24 1:46 PM EST    Attending:  Rosemary Victoria MD Discharging Provider: Rosemary Victoria MD       Hospital Course:     Brief HPI: Vipul Sarabia is a 83 y.o. male with pmh of Alzheimer's, aortic stenosis s/p AVR who presents with Complex partial seizures with consciousness impaired (HCC)-patient was admitted with seizures.  Patient also has acute encephalopathy in the setting of underlying dementia, per family sundown's .  Neurology was consulted.  EEG showed some frontal disturbance.  Patient will be discharged on Keppra thousand 100 mg twice daily.  Needs outpatient follow-up with his neurologist.  Blood pressure is mildly elevated and has been started on low-dose of lisinopril    Brief Problem Based Course:     Acute metabolic encephalopathy with underlying dementia-slightly better.  More awake, following commands  -Patient has a history of sundowning  -At baseline patient's dementia is mild and compensated     Epilepsy with recurrent seizures  -Neurology consult appreciated.    - EEG shows some frontal focal disturbance   -On Keppra at home 1500 mg twice daily( was not taking it ) .  Per neurology patient to continue at this dose.  Patient to follow-up with his outpatient neurologist for dose adjustment  -PT OT-consult noted     Dysphagia- improved advance diet   -Patient tolerated diet slightly better.   -Speech to reevaluate today, consult appreciated     Lactic acidosis  -Most likely secondary to seizure     History of aortic stenosis s/p AVR     BPH  -On Flomax and finasteride.  Reordered     Hypertension-trending on the higher side, start low-dose lisinopril  -Monitor blood pressure.      Hyperlipidemia  -Continue statin     Wrist swelling tenderness  -X-ray shows no fracture.  Monitor    The  Limited evaluation of the orbits is unremarkable. SINUSES: The paranasal sinuses and mastoid air cells are clear. SOFT TISSUES/SKULL:  No lytic or blastic osseous lesions are identified.     No acute intracranial process identified. The findings were sent to the Radiology Results Communication Center at 8:27 am on 12/30/2023 to be communicated to a licensed caregiver. The findings were subsequently relayed to Dr. Rios by the CORE team at 8:34 a.m. on 12/30/2023.     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  The patient has undergone interval TAVR.  The lungs are without acute focal process.  There is no effusion or pneumothorax. The cardiomediastinal silhouette is stable. The osseous structures are stable.     No acute process.       CBC:   Recent Labs     01/03/24  0747 01/04/24  0908   WBC 6.3 5.9   HGB 11.2* 11.4*   PLT 58* 67*     BMP:  No results for input(s): \"NA\", \"K\", \"CL\", \"CO2\", \"BUN\", \"CREATININE\", \"GLUCOSE\" in the last 72 hours.  Hepatic: No results for input(s): \"AST\", \"ALT\", \"ALB\", \"BILITOT\", \"ALKPHOS\" in the last 72 hours.  Lipids: No results found for: \"CHOL\", \"HDL\", \"TRIG\"  Hemoglobin A1C: No results found for: \"LABA1C\"  TSH: No results found for: \"TSH\"  Troponin: No results found for: \"TROPONINT\"  Lactic Acid: No results for input(s): \"LACTA\" in the last 72 hours.  BNP: No results for input(s): \"PROBNP\" in the last 72 hours.  UA:  Lab Results   Component Value Date/Time    NITRU Negative 12/30/2023 08:37 AM    COLORU Yellow 12/30/2023 08:37 AM    PHUR 6.0 12/30/2023 08:37 AM    PHUR 6.0 12/30/2023 08:37 AM    LABCAST 1-3 Fine Gran. 12/01/2017 10:26 AM    WBCUA 0-2 12/30/2023 08:37 AM    RBCUA None

## 2024-01-04 NOTE — PROGRESS NOTES
Pt alert, oriented and disoriented at times, vss on room air with exception to intermittent elevated bp. Pt voiding adequately via incontinence and external catheter. Pt did not have BM this shift. Pt is tolerating fluids and foods PO. All fall precautions in place, call light within pt reach

## 2024-01-04 NOTE — DISCHARGE INSTR - COC
Continuity of Care Form    Patient Name: Vipul Sarabia   :  1940  MRN:  [id number]    Admit date:  2023  Discharge date:      Code Status Order: Full Code   Advance Directives:     Admitting Physician:  Emeterio Romano MD  PCP: Joel Aj MD    Discharging Nurse: Sayda  Discharging Hospital Unit/Room#: 5505/5505-01  Discharging Unit Phone Number:     Emergency Contact:   Extended Emergency Contact Information  Primary Emergency Contact: Jakob Sarabia  Home Phone: [phone]  Mobile Phone: [phone]  Relation: Child  Secondary Emergency Contact: Cata Sarabia  Home Phone: [phone]  Mobile Phone: [phone]  Relation: Grandchild    Past Surgical History:  Past Surgical History:   Procedure Laterality Date    ANKLE SURGERY      RT    APPENDECTOMY      CARDIAC VALVE REPLACEMENT  2022    CHOLECYSTECTOMY      EYE SURGERY      bilateral cataracts    HERNIA REPAIR      rihr  x 2    KNEE ARTHROSCOPY Right 2013    KNEE SURGERY      QUADRACEPS TENDON REPAIR Left 2015    SHOULDER ARTHROSCOPY  2011    video arthroscopy shoulder debridement acromioplasty       Immunization History:     There is no immunization history on file for this patient.    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) G30.9, F02.80    ETOH abuse F10.10    Complex partial seizures with consciousness impaired (HCC) G40.209    Epilepsy, focal (HCC) G40.109    HTN (hypertension), benign I10    Dyslipidemia E78.5    Complex partial seizure disorder (HCC) G40.209        Isolation/Infection:   Isolation            No Isolation          Patient Infection Status       None to display            Nurse Assessment:  Last Vital Signs: BP (!) 171/88   Pulse 73   Temp 98.3 °F (36.8 °C) (Oral)   Resp 18   Ht 1.829 m (6' 0.01\")   Wt 86.4 kg (190 lb 7.6 oz)   SpO2 95%   BMI 25.83 kg/m²     Last documented pain score (0-10 scale): Pain Level: 0  Last Weight:   Wt Readings from Last 1 Encounters:   12/30/23 86.4 kg (190 lb 7.6 oz)     Mental Status: A&O1-2, baseline dementia     IV Access:  - None    Nursing Mobility/ADLs:  Walking   Dependent  Transfer  Assisted  Bathing  Dependent  Dressing  Assisted  Toileting  Assisted  Feeding  Assisted  Med Admin  Assisted  Med Delivery   whole in apple sauce     Wound Care Documentation and Therapy:        Elimination:  Continence:   Bowel: No  Bladder: No  Urinary Catheter: None   Colostomy/Ileostomy/Ileal Conduit: No       Date of Last BM: 12/31/23    Intake/Output Summary (Last 24 hours) at 1/4/2024 1332  Last data filed at 1/4/2024 0600  Gross per 24 hour   Intake 490 ml   Output 2075 ml   Net -1585 ml     I/O last 3 completed shifts:  In: 730 [P.O.:720; I.V.:10]  Out: 3275 [Urine:3275]    Safety Concerns:     Sundowners [place]rome, At Risk for Falls, History of Seizures, and Aspiration Risk    Impairments/Disabilities:      None    Nutrition Therapy:  Current Nutrition Therapy:   - Oral Diet:  Dysphagia - Soft and Bite Sized    Routes of Feeding: Oral  Liquids: Thin Liquids  Daily Fluid Restriction: no  Last Modified Barium Swallow with Video (Video Swallowing Test): not done    Treatments at the Time of Hospital Discharge:   Respiratory Treatments:   Oxygen Therapy:  is not on home oxygen therapy.  Ventilator:    - No ventilator support    Rehab Therapies: Physical Therapy, Occupational Therapy, and Speech/Language Therapy  Weight Bearing Status/Restrictions: No weight bearing restrictions  Other Medical Equipment (for information only,  NOT a DME order):    Other Treatments:     Patient's personal belongings (please select all that are sent with patient):  None    RN SIGNATURE:  Electronically signed by Sayda Zuniga RN on 1/4/24 at 1:58 PM EST    CASE MANAGEMENT/SOCIAL WORK SECTION    Inpatient Status Date: ***    Readmission Risk Assessment Score:  Readmission Risk              Risk of Unplanned Readmission:  16           Discharging to Facility/ Agency   Name: [facility]      / signature: Electronically signed by Maylin Juárez RN on 1/4/24 at 2:11 PM EST    PHYSICIAN SECTION    Prognosis: Fair    Condition at Discharge: Stable    Rehab Potential (if transferring to Rehab): Fair    Recommended Labs or Other Treatments After Discharge: Fu with neurology    Physician Certification: I certify the above information and transfer of Vipul Sarabia  is necessary for the continuing treatment of the diagnosis listed and that he requires Acute Rehab for less 30 days.     Update Admission H&P: No change in H&P    PHYSICIAN SIGNATURE:  Electronically signed by Rosemary Victoria MD on 1/4/24 at 1:45 PM EST

## 2024-01-04 NOTE — PROGRESS NOTES
[facility] - Acute Rehab Unit   After review, this patient is felt to be:       [x]  Appropriate for Acute Inpatient Rehab- wants to go to [facility].     []  Appropriate for Acute Inpatient Rehab Pending Insurance Authorization    []  Not appropriate for Acute Inpatient Rehab    []  Referral received and ARU reviewing patient        Will notify CM/SW with further updates. Thank you for the referral.    Anuja HARRISON, OTR/L  Clinical Liaison- [facility]   (P): [phone]  (F): [phone]

## 2024-01-04 NOTE — PROGRESS NOTES
V2.0    [facility] Progress Note      Name:  Vipul Sarabia /Age/Sex: 1940  (83 y.o. male)   MRN & CSN:  [id number] & [id number] Encounter Date/Time: 2024 7:30 AM EST   Location:  5505/5505-01 PCP: Joel Aj MD     Attending:Rosemary Victoria MD       Hospital Day: 6    Assessment and Recommendations   Vipul Sarabia is a 83 y.o. male with pmh of Alzheimer's, aortic stenosis s/p AVR who presents with Complex partial seizures with consciousness impaired (HCC)-patient was admitted with 6 seizures.  Patient also has acute encephalopathy in the setting of underlying dementia, per family sundown's at home      Plan:     Acute metabolic encephalopathy with underlying dementia-slightly better.  More awake, following commands  -Patient has a history of sundowning  -At baseline patient's dementia is mild and compensated    Epilepsy with recurrent seizures  -Neurology consult appreciated.    - EEG shows some frontal focal disturbance   -On Keppra at home 1500 mg twice daily( was not taking it ) .  Per neurology patient to continue at this dose.  Patient to follow-up with his outpatient neurologist for dose adjustment  -PT OT-consult noted    Dysphagia- improved advance diet   -Patient tolerated diet slightly better.   -Speech to reevaluate today, consult appreciated    Lactic acidosis  -Most likely secondary to seizure    History of aortic stenosis s/p AVR    BPH  -On Flomax and finasteride.  Reordered    Hypertension-trending on the higher side, start low-dose lisinopril  -Monitor blood pressure.     Hyperlipidemia  -Continue statin    Wrist swelling tenderness  -X-ray shows no fracture.  Monitor      Diet ADULT DIET; Full Liquid   DVT Prophylaxis [x] Lovenox, []  Heparin, [] SCDs, [] Ambulation,  [] Eliquis, [] Xarelto  [] Coumadin   Code Status Full Code   Disposition From: home  Expected Disposition: rehab  Estimated Date of Discharge: today            Personally reviewed Lab Studies and  Imaging     Discussed management of the case with case management, PT OT. Needs rehab,     Discussed with granddaughter  Discussed with PM n R resident- they will evaluate the patient     Discussed with speech therapy.  - advance diet     X-ray wrist reviewed personally.  No fracture swelling present    Discussed with neurology,             Drugs that require monitoring for toxicity include Keppra and the method of monitoring was BMP        Subjective:     Chief Complaint: Seizure    Vipul Sarabia is a 83 y.o. male who presents with seizure    Patient is a l awake today.  Tolerated a liquid diet.  Granddaughter at bedside   Still has some wrist pain    Has some constipation     Review of Systems:      Pertinent positives and negatives discussed in HPI    Objective:     Intake/Output Summary (Last 24 hours) at 1/4/2024 0901  Last data filed at 1/4/2024 0600  Gross per 24 hour   Intake 610 ml   Output 2075 ml   Net -1465 ml      Vitals:   Vitals:    01/03/24 2031 01/03/24 2352 01/04/24 0445 01/04/24 0845   BP: (!) 145/88 132/75 (!) 158/90 (!) 149/83   Pulse: 62 62 60 62   Resp: 16 18 16 16   Temp: 98 °F (36.7 °C) 98 °F (36.7 °C) 98.1 °F (36.7 °C) 97.6 °F (36.4 °C)   TempSrc: Axillary Axillary Axillary Oral   SpO2: 97% 97% 95%    Weight:       Height:             Physical Exam:      General: NAD  Eyes: EOMI  ENT: neck supple  Cardiovascular: Regular rate.  Respiratory: Clear to auscultation  Gastrointestinal: Soft, non tender  Genitourinary: no suprapubic tenderness  Musculoskeletal: Right wrist edema present improving  Skin: warm, dry  Neuro: More awake today  Psych: More awake today        Medications:   Medications:    levETIRAcetam  1,500 mg Oral BID    finasteride  5 mg Oral Daily    tamsulosin  0.4 mg Oral Nightly    atorvastatin  80 mg Oral Nightly    sodium chloride flush  5-40 mL IntraVENous 2 times per day    enoxaparin  40 mg SubCUTAneous Daily      Infusions:    sodium chloride 50 mL/hr at 01/02/24 8930  \"ALKPHOS\" in the last 72 hours.    Lipids: No results found for: \"CHOL\", \"HDL\", \"TRIG\"  Hemoglobin A1C: No results found for: \"LABA1C\"  TSH: No results found for: \"TSH\"  Troponin: No results found for: \"TROPONINT\"  Lactic Acid:   No results for input(s): \"LACTA\" in the last 72 hours.    BNP: No results for input(s): \"PROBNP\" in the last 72 hours.  UA:  Lab Results   Component Value Date/Time    NITRU Negative 12/30/2023 08:37 AM    COLORU Yellow 12/30/2023 08:37 AM    PHUR 6.0 12/30/2023 08:37 AM    PHUR 6.0 12/30/2023 08:37 AM    LABCAST 1-3 Fine Gran. 12/01/2017 10:26 AM    WBCUA 0-2 12/30/2023 08:37 AM    RBCUA None seen 12/30/2023 08:37 AM    BACTERIA 1+ 12/30/2023 08:37 AM    CLARITYU Clear 12/30/2023 08:37 AM    SPECGRAV 1.015 12/30/2023 08:37 AM    LEUKOCYTESUR Negative 12/30/2023 08:37 AM    UROBILINOGEN 0.2 12/30/2023 08:37 AM    BILIRUBINUR Negative 12/30/2023 08:37 AM    BLOODU Negative 12/30/2023 08:37 AM    GLUCOSEU Negative 12/30/2023 08:37 AM    KETUA Negative 12/30/2023 08:37 AM    AMORPHOUS Rare 12/01/2017 10:26 AM     Urine Cultures: No results found for: \"LABURIN\"  Blood Cultures:   Lab Results   Component Value Date/Time    BC No growth after 5 days of incubation. 12/01/2017 10:30 AM     Lab Results   Component Value Date/Time    BLOODCULT2 No growth after 5 days of incubation. 12/01/2017 10:57 AM     Organism: No results found for: \"ORG\"      Electronically signed by Rosemary Victoria MD on 1/4/2024 at 9:01 AM

## 2024-01-04 NOTE — PROGRESS NOTES
Pt discharged to [facility] rehab, belongings sent with pt. Report given to RN receiving pt. Transported by [facility]. Son with pt during discharge. PIV x2 removed.

## 2024-01-04 NOTE — PLAN OF CARE
Problem: Discharge Planning  Goal: Discharge to home or other facility with appropriate resources  Outcome: Completed     Problem: Discharge Planning  Goal: Discharge to home or other facility with appropriate resources  Outcome: Completed     Problem: Pain  Goal: Verbalizes/displays adequate comfort level or baseline comfort level  Outcome: Completed     Problem: Skin/Tissue Integrity  Goal: Absence of new skin breakdown  Description: 1.  Monitor for areas of redness and/or skin breakdown  2.  Assess vascular access sites hourly  3.  Every 4-6 hours minimum:  Change oxygen saturation probe site  4.  Every 4-6 hours:  If on nasal continuous positive airway pressure, respiratory therapy assess nares and determine need for appliance change or resting period.  Outcome: Completed     Problem: Safety - Adult  Goal: Free from fall injury  Outcome: Completed     Problem: ABCDS Injury Assessment  Goal: Absence of physical injury  Outcome: Completed     Problem: Safety - Medical Restraint  Goal: Remains free of injury from restraints (Restraint for Interference with Medical Device)  Description: INTERVENTIONS:  1. Determine that other, less restrictive measures have been tried or would not be effective before applying the restraint  2. Evaluate the patient's condition at the time of restraint application  3. Inform patient/family regarding the reason for restraint  4. Q2H: Monitor safety, psychosocial status, comfort, nutrition and hydration  Outcome: Completed

## 2024-01-04 NOTE — CARE COORDINATION
Case Management Assessment            Discharge Note                    Date / Time of Note: 1/4/2024 1:26 PM                  Discharge Note Completed by: Maylin Juárez RN    Patient Name: Vipul Sarabia   YOB: 1940  Diagnosis: Seizures (HCC) [R56.9]  Seizure (HCC) [R56.9]   Date / Time: 12/30/2023 12:03 PM    Current PCP: Joel Aj MD  Clinic patient: No    Hospitalization in the last 30 days: No       Advance Directives:  Code Status: Full Code  [place] DNR form completed and on chart: Not Indicated    Financial:  Payor: MEDICARE / Plan: MEDICARE PART A AND B / Product Type: *No Product type* /      Pharmacy:    [facility] #[id number] [address] [facility] [phone] - F [phone]  [address] [place] [id number]  Phone: [phone] Fax: [phone]    [facility] #[id number] [facility] [address] [facility] [phone] -  [phone]  [address] [place] [id number]  Phone: [phone] Fax: [phone]      Assistance purchasing medications?:    Assistance provided by Case Management: None at this time    Does patient want to participate in local refill/ meds to beds program?:      Meds To Beds General Rules:  1. Can ONLY be done Monday- Friday between 8:30am-5pm  2. Prescription(s) must be in pharmacy by 3pm to be filled same day  3.Copy of patient's insurance/ prescription drug card and patient face sheet must be sent along with the prescription(s)  4. Cost of Rx cannot be added to hospital bill. If financial assistance is needed, please contact unit  or ;  or  CANNOT provide pharmacy voucher for patients co-pays  5. Patients can then  the prescription on their way out of the hospital at discharge, or pharmacy can deliver to the bedside if staff is available. (payment due at time of pick-up or delivery - cash, check, or card accepted)     Able to afford home

## 2024-01-04 NOTE — CONSULTS
Consult Note  Physical Medicine and Rehabilitation    Patient: Vipul Sarabia  [id number]  Date: 2024      Chief Complaint: seizure    History of Present Illness/Hospital Course:  83-year-old male PMH of epilepsy, AS status post AVR, SSS status post PPM, dementia.  He presents for concerns of seizure.  Patient semiology for seizures or staring spells. Pt was seen on camera getting dressed and stopped with staring episode. Pt has not been out of bed for a few days    Prior Level of Function:  Independent for mobility, ADLs, and IADLs    Current Level of Function:  PT AM-PAC raw score 8  OT AM-PAC raw score 9    Pertinent Social History:  Support: family  Home set-up: lives at home alone with, 7 days a week family support    Past Medical History:   Diagnosis Date    Ankle fracture, right     Arthritis     Diastolic murmur     Gout     Heart valve problem     2011-long term ,no problem with    Hyperlipidemia     Hypertension     Pacemaker        Past Surgical History:   Procedure Laterality Date    ANKLE SURGERY      RT    APPENDECTOMY      CARDIAC VALVE REPLACEMENT  2022    CHOLECYSTECTOMY      EYE SURGERY      bilateral cataracts    HERNIA REPAIR      rihr  x 2    KNEE ARTHROSCOPY Right 2013    KNEE SURGERY      QUADRACEPS TENDON REPAIR Left 2015    SHOULDER ARTHROSCOPY  2011    video arthroscopy shoulder debridement acromioplasty       Family History   Problem Relation Age of Onset    Heart Disease Mother     Heart Disease Father        Social History     Socioeconomic History    Marital status:      Spouse name: Tiffany    Number of children: 2    Years of education: high school    Highest education level: None   Tobacco Use    Smoking status: Former     Current packs/day: 0.00     Types: Cigarettes     Quit date: 2003     Years since quittin.1    Smokeless tobacco: Former    Tobacco comments:     cigar smoker   Substance and Sexual Activity    Alcohol use: Yes

## 2024-01-24 ENCOUNTER — TELEPHONE (OUTPATIENT)
Dept: NEUROLOGY | Age: 84
End: 2024-01-24

## 2024-01-24 NOTE — TELEPHONE ENCOUNTER
Spoke to Cata and she states that Vipul was taken off of all his seizure medications a year ago during a hospital stay. She states that he recently had a fall where he may have hit his head and has since started having seizures again. He was recently hospitalized and put on Keppra 750 mg BID, and granddaughter is worried/ concerned about who will take over the Keppra script that he was given in the hospital.     LOV 8/4/22- Continue Keppra 1500 mg twice daily     POV 1.25.24

## 2024-01-24 NOTE — TELEPHONE ENCOUNTER
Granddaughter called needing an appt for follow up did not want to wait until the next opening.  She feels like he needs to be seen sooner because of his seizure medications.

## 2024-01-25 ENCOUNTER — OFFICE VISIT (OUTPATIENT)
Dept: NEUROLOGY | Age: 84
End: 2024-01-25
Payer: MEDICARE

## 2024-01-25 VITALS
SYSTOLIC BLOOD PRESSURE: 110 MMHG | DIASTOLIC BLOOD PRESSURE: 75 MMHG | HEART RATE: 79 BPM | HEIGHT: 72 IN | WEIGHT: 180 LBS | BODY MASS INDEX: 24.38 KG/M2

## 2024-01-25 DIAGNOSIS — I10 HTN (HYPERTENSION), BENIGN: ICD-10-CM

## 2024-01-25 DIAGNOSIS — G30.9 DEMENTIA DUE TO ALZHEIMER'S DISEASE (HCC): ICD-10-CM

## 2024-01-25 DIAGNOSIS — G40.909 SEIZURE DISORDER (HCC): Primary | ICD-10-CM

## 2024-01-25 DIAGNOSIS — F02.80 DEMENTIA DUE TO ALZHEIMER'S DISEASE (HCC): ICD-10-CM

## 2024-01-25 PROCEDURE — G8484 FLU IMMUNIZE NO ADMIN: HCPCS | Performed by: PSYCHIATRY & NEUROLOGY

## 2024-01-25 PROCEDURE — 1123F ACP DISCUSS/DSCN MKR DOCD: CPT | Performed by: PSYCHIATRY & NEUROLOGY

## 2024-01-25 PROCEDURE — 3078F DIAST BP <80 MM HG: CPT | Performed by: PSYCHIATRY & NEUROLOGY

## 2024-01-25 PROCEDURE — 1111F DSCHRG MED/CURRENT MED MERGE: CPT | Performed by: PSYCHIATRY & NEUROLOGY

## 2024-01-25 PROCEDURE — 1036F TOBACCO NON-USER: CPT | Performed by: PSYCHIATRY & NEUROLOGY

## 2024-01-25 PROCEDURE — G8420 CALC BMI NORM PARAMETERS: HCPCS | Performed by: PSYCHIATRY & NEUROLOGY

## 2024-01-25 PROCEDURE — G8427 DOCREV CUR MEDS BY ELIG CLIN: HCPCS | Performed by: PSYCHIATRY & NEUROLOGY

## 2024-01-25 PROCEDURE — 99214 OFFICE O/P EST MOD 30 MIN: CPT | Performed by: PSYCHIATRY & NEUROLOGY

## 2024-01-25 PROCEDURE — 3074F SYST BP LT 130 MM HG: CPT | Performed by: PSYCHIATRY & NEUROLOGY

## 2024-01-25 RX ORDER — LEVETIRACETAM 1000 MG/1
1000 TABLET ORAL 2 TIMES DAILY
COMMUNITY
Start: 2024-01-22 | End: 2024-01-25 | Stop reason: ALTCHOICE

## 2024-01-25 RX ORDER — LEVETIRACETAM 750 MG/1
750 TABLET ORAL 2 TIMES DAILY
Qty: 60 TABLET | Refills: 2 | Status: SHIPPED | OUTPATIENT
Start: 2024-01-25

## 2024-01-25 NOTE — PROGRESS NOTES
medication regimen and side effect from these medications.           Assessment:   Diagnosis Orders   1. Seizure disorder (HCC)  levETIRAcetam (KEPPRA) 750 MG tablet      2. 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 HTN (hypertension), benign              Lengthy discussion with the patient's family regarding seizure precaution, side effect from Keppra and risk of toxicity with Keppra and excessive sedation.  We agreed that the patient should be on lifelong AED at some point.  If they decided to switch or decrease medication, they should give me a call my office to address this issue.  We agreed that he is having some side effect from Keppra with excessive sedation.  Will decrease Keppra now to 750 mg twice daily.  3-month refill for Keppra  Phone update in 2 weeks  If he continues to have excessive sedation from Keppra, will consider Lamictal  Discussed seizure precautions, risk of falling or injury and improving sleep hygiene  Continue blood pressure monitor on current medications  Blood pressure diary  PT, OT and speech  Multivitamin daily  Avoid daytime naps  Follow-up 3 months to consider further management of his dementia.

## 2024-02-02 ENCOUNTER — TELEPHONE (OUTPATIENT)
Dept: NEUROLOGY | Age: 84
End: 2024-02-02

## 2024-02-02 NOTE — TELEPHONE ENCOUNTER
LOV: 1/25/24  NOV: 7/10/24    Preferred pharmacy:     Name of caller: [facility]     Reason for call: Jocelyne Called today to give update. Keppra was decreased to 750 mg twice daily on 1/25/24.  Cata reports that they would like to try 500 mg twice a day of Keppra as she states the patient is still very sleepy during the day. She reports no other side effect from the Keppra just the excessive sleepiness. She wants to know if they could lower the dose before trying Lamictal.    She also reports that they saw his PCP the same day and he agrees that the keppra needs to be lowered. She states that they are willing to take the risk of another seizure. States family is with him all the time.

## 2024-02-05 NOTE — TELEPHONE ENCOUNTER
Spoke to Cata and advised per JACKIE Nguyễn to decrease keppra to 500mg twice daily.     Cata states no refill needed at this time as they still have 500mg from a previous prescriber. Cata will advise when refill is needed.     Cata will call in 2 weeks to report if decrease has been helpful.

## 2024-05-03 NOTE — PROGRESS NOTES
Spoke with pt's son regarding cEEG.  Pt's son expressed concern that pt may pick at leads and not leave them on his head.  This RN let son know that there was a possibility that restraints could be utilized if need be.  Son told this RN that he does not want pt in restraints for any reason.  MD aware as well.   [phone]

## 2024-10-29 NOTE — CARE COORDINATION
CM spoke with patient's son and grand daughter at bedside.  They would like a referral to [facility].  KAREN spoke with Ana Maria at [facility] who is reviewing pt clinicals.  No pre-cert if accepted.    Maylin Juárez RN, BSN,    Ortho/Neuro   [phone]     Please see treatment plan    Thank you